# Patient Record
Sex: FEMALE | Race: WHITE | URBAN - METROPOLITAN AREA
[De-identification: names, ages, dates, MRNs, and addresses within clinical notes are randomized per-mention and may not be internally consistent; named-entity substitution may affect disease eponyms.]

---

## 2017-02-24 RX ORDER — RIVAROXABAN 20 MG/1
TABLET, FILM COATED ORAL
Qty: 90 TABLET | Refills: 2 | Status: SHIPPED | OUTPATIENT
Start: 2017-02-24 | End: 2017-11-24 | Stop reason: SDUPTHER

## 2017-02-24 RX ORDER — DIGOXIN 250 MCG
TABLET ORAL
Qty: 90 TABLET | Refills: 2 | Status: SHIPPED | OUTPATIENT
Start: 2017-02-24 | End: 2017-03-09 | Stop reason: ALTCHOICE

## 2017-03-09 ENCOUNTER — OFFICE VISIT (OUTPATIENT)
Dept: CARDIOLOGY CLINIC | Age: 82
End: 2017-03-09

## 2017-03-09 VITALS
SYSTOLIC BLOOD PRESSURE: 120 MMHG | BODY MASS INDEX: 25.39 KG/M2 | WEIGHT: 130 LBS | HEART RATE: 60 BPM | DIASTOLIC BLOOD PRESSURE: 80 MMHG

## 2017-03-09 DIAGNOSIS — R06.02 SOB (SHORTNESS OF BREATH): ICD-10-CM

## 2017-03-09 DIAGNOSIS — I48.21 PERMANENT ATRIAL FIBRILLATION (HCC): Primary | ICD-10-CM

## 2017-03-09 PROCEDURE — 4040F PNEUMOC VAC/ADMIN/RCVD: CPT | Performed by: NURSE PRACTITIONER

## 2017-03-09 PROCEDURE — 1123F ACP DISCUSS/DSCN MKR DOCD: CPT | Performed by: NURSE PRACTITIONER

## 2017-03-09 PROCEDURE — 1036F TOBACCO NON-USER: CPT | Performed by: NURSE PRACTITIONER

## 2017-03-09 PROCEDURE — G8484 FLU IMMUNIZE NO ADMIN: HCPCS | Performed by: NURSE PRACTITIONER

## 2017-03-09 PROCEDURE — 99214 OFFICE O/P EST MOD 30 MIN: CPT | Performed by: NURSE PRACTITIONER

## 2017-03-09 PROCEDURE — 1090F PRES/ABSN URINE INCON ASSESS: CPT | Performed by: NURSE PRACTITIONER

## 2017-03-09 PROCEDURE — G8427 DOCREV CUR MEDS BY ELIG CLIN: HCPCS | Performed by: NURSE PRACTITIONER

## 2017-03-09 PROCEDURE — G8420 CALC BMI NORM PARAMETERS: HCPCS | Performed by: NURSE PRACTITIONER

## 2017-03-17 RX ORDER — AMLODIPINE BESYLATE 5 MG/1
TABLET ORAL
Qty: 30 TABLET | Refills: 5 | Status: SHIPPED | OUTPATIENT
Start: 2017-03-17 | End: 2017-08-16 | Stop reason: SDUPTHER

## 2017-03-22 RX ORDER — METOPROLOL TARTRATE 50 MG/1
TABLET, FILM COATED ORAL
Qty: 135 TABLET | Refills: 5 | Status: SHIPPED | OUTPATIENT
Start: 2017-03-22 | End: 2017-04-03 | Stop reason: ALTCHOICE

## 2017-04-03 ENCOUNTER — OFFICE VISIT (OUTPATIENT)
Dept: CARDIOLOGY CLINIC | Age: 82
End: 2017-04-03

## 2017-04-03 VITALS
WEIGHT: 127.4 LBS | BODY MASS INDEX: 24.88 KG/M2 | DIASTOLIC BLOOD PRESSURE: 76 MMHG | SYSTOLIC BLOOD PRESSURE: 110 MMHG | HEART RATE: 76 BPM

## 2017-04-03 DIAGNOSIS — I48.91 ATRIAL FIBRILLATION, UNSPECIFIED TYPE (HCC): ICD-10-CM

## 2017-04-03 DIAGNOSIS — I35.0 AORTIC VALVE STENOSIS, UNSPECIFIED ETIOLOGY: ICD-10-CM

## 2017-04-03 DIAGNOSIS — R06.02 SOB (SHORTNESS OF BREATH): Primary | ICD-10-CM

## 2017-04-03 DIAGNOSIS — E78.5 HYPERLIPIDEMIA, UNSPECIFIED HYPERLIPIDEMIA TYPE: ICD-10-CM

## 2017-04-03 PROCEDURE — 4040F PNEUMOC VAC/ADMIN/RCVD: CPT | Performed by: NURSE PRACTITIONER

## 2017-04-03 PROCEDURE — 1090F PRES/ABSN URINE INCON ASSESS: CPT | Performed by: NURSE PRACTITIONER

## 2017-04-03 PROCEDURE — 1123F ACP DISCUSS/DSCN MKR DOCD: CPT | Performed by: NURSE PRACTITIONER

## 2017-04-03 PROCEDURE — G8427 DOCREV CUR MEDS BY ELIG CLIN: HCPCS | Performed by: NURSE PRACTITIONER

## 2017-04-03 PROCEDURE — G8420 CALC BMI NORM PARAMETERS: HCPCS | Performed by: NURSE PRACTITIONER

## 2017-04-03 PROCEDURE — 99214 OFFICE O/P EST MOD 30 MIN: CPT | Performed by: NURSE PRACTITIONER

## 2017-04-03 PROCEDURE — 1036F TOBACCO NON-USER: CPT | Performed by: NURSE PRACTITIONER

## 2017-07-08 DIAGNOSIS — R06.02 SOB (SHORTNESS OF BREATH): ICD-10-CM

## 2017-07-08 DIAGNOSIS — I10 ESSENTIAL HYPERTENSION: ICD-10-CM

## 2017-07-08 DIAGNOSIS — R11.0 NAUSEA: ICD-10-CM

## 2017-07-08 DIAGNOSIS — T46.0X5D: ICD-10-CM

## 2017-07-10 RX ORDER — FUROSEMIDE 20 MG/1
TABLET ORAL
Qty: 60 TABLET | Refills: 5 | Status: SHIPPED | OUTPATIENT
Start: 2017-07-10 | End: 2017-09-27 | Stop reason: ALTCHOICE

## 2017-07-17 ENCOUNTER — OFFICE VISIT (OUTPATIENT)
Dept: CARDIOLOGY CLINIC | Age: 82
End: 2017-07-17

## 2017-07-17 ENCOUNTER — TELEPHONE (OUTPATIENT)
Dept: CARDIOLOGY CLINIC | Age: 82
End: 2017-07-17

## 2017-07-17 VITALS
DIASTOLIC BLOOD PRESSURE: 70 MMHG | BODY MASS INDEX: 25.39 KG/M2 | SYSTOLIC BLOOD PRESSURE: 102 MMHG | HEART RATE: 60 BPM | WEIGHT: 130 LBS

## 2017-07-17 DIAGNOSIS — I48.20 CHRONIC ATRIAL FIBRILLATION (HCC): ICD-10-CM

## 2017-07-17 DIAGNOSIS — I35.0 NONRHEUMATIC AORTIC VALVE STENOSIS: ICD-10-CM

## 2017-07-17 DIAGNOSIS — R06.02 SOB (SHORTNESS OF BREATH): ICD-10-CM

## 2017-07-17 DIAGNOSIS — I35.0 AORTIC VALVE STENOSIS, UNSPECIFIED ETIOLOGY: Primary | ICD-10-CM

## 2017-07-17 PROCEDURE — 4040F PNEUMOC VAC/ADMIN/RCVD: CPT | Performed by: NURSE PRACTITIONER

## 2017-07-17 PROCEDURE — G8427 DOCREV CUR MEDS BY ELIG CLIN: HCPCS | Performed by: NURSE PRACTITIONER

## 2017-07-17 PROCEDURE — 1123F ACP DISCUSS/DSCN MKR DOCD: CPT | Performed by: NURSE PRACTITIONER

## 2017-07-17 PROCEDURE — 1090F PRES/ABSN URINE INCON ASSESS: CPT | Performed by: NURSE PRACTITIONER

## 2017-07-17 PROCEDURE — 1036F TOBACCO NON-USER: CPT | Performed by: NURSE PRACTITIONER

## 2017-07-17 PROCEDURE — G8419 CALC BMI OUT NRM PARAM NOF/U: HCPCS | Performed by: NURSE PRACTITIONER

## 2017-07-17 PROCEDURE — 99214 OFFICE O/P EST MOD 30 MIN: CPT | Performed by: NURSE PRACTITIONER

## 2017-07-18 ENCOUNTER — HOSPITAL ENCOUNTER (OUTPATIENT)
Dept: NON INVASIVE DIAGNOSTICS | Age: 82
Discharge: HOME | End: 2017-07-18
Attending: NURSE PRACTITIONER | Admitting: NURSE PRACTITIONER

## 2017-07-18 DIAGNOSIS — I48.21 PERMANENT ATRIAL FIBRILLATION (HCC): ICD-10-CM

## 2017-07-18 DIAGNOSIS — R06.02 SHORTNESS OF BREATH: ICD-10-CM

## 2017-07-18 DIAGNOSIS — R06.02 SOB (SHORTNESS OF BREATH): ICD-10-CM

## 2017-07-18 DIAGNOSIS — I35.0 AORTIC VALVE STENOSIS, UNSPECIFIED ETIOLOGY: ICD-10-CM

## 2017-07-18 LAB
LV EF: 58 %
LVEF MODALITY: NORMAL

## 2017-07-24 ENCOUNTER — OFFICE VISIT (OUTPATIENT)
Dept: CARDIOLOGY CLINIC | Age: 82
End: 2017-07-24

## 2017-07-24 VITALS
BODY MASS INDEX: 25 KG/M2 | HEART RATE: 68 BPM | WEIGHT: 128 LBS | SYSTOLIC BLOOD PRESSURE: 100 MMHG | DIASTOLIC BLOOD PRESSURE: 60 MMHG

## 2017-07-24 DIAGNOSIS — R42 DIZZINESS: ICD-10-CM

## 2017-07-24 DIAGNOSIS — Z86.73 HISTORY OF CVA (CEREBROVASCULAR ACCIDENT): ICD-10-CM

## 2017-07-24 DIAGNOSIS — I35.0 NONRHEUMATIC AORTIC VALVE STENOSIS: Primary | ICD-10-CM

## 2017-07-24 DIAGNOSIS — R06.02 SOB (SHORTNESS OF BREATH): ICD-10-CM

## 2017-07-24 PROCEDURE — G8419 CALC BMI OUT NRM PARAM NOF/U: HCPCS | Performed by: INTERNAL MEDICINE

## 2017-07-24 PROCEDURE — 1090F PRES/ABSN URINE INCON ASSESS: CPT | Performed by: INTERNAL MEDICINE

## 2017-07-24 PROCEDURE — 1036F TOBACCO NON-USER: CPT | Performed by: INTERNAL MEDICINE

## 2017-07-24 PROCEDURE — 4040F PNEUMOC VAC/ADMIN/RCVD: CPT | Performed by: INTERNAL MEDICINE

## 2017-07-24 PROCEDURE — 1123F ACP DISCUSS/DSCN MKR DOCD: CPT | Performed by: INTERNAL MEDICINE

## 2017-07-24 PROCEDURE — G8427 DOCREV CUR MEDS BY ELIG CLIN: HCPCS | Performed by: INTERNAL MEDICINE

## 2017-07-24 PROCEDURE — 99214 OFFICE O/P EST MOD 30 MIN: CPT | Performed by: INTERNAL MEDICINE

## 2017-07-25 ENCOUNTER — TELEPHONE (OUTPATIENT)
Dept: CARDIOTHORACIC SURGERY | Age: 82
End: 2017-07-25

## 2017-08-01 ENCOUNTER — HOSPITAL ENCOUNTER (OUTPATIENT)
Dept: CT IMAGING | Age: 82
Discharge: HOME | End: 2017-08-01
Admitting: INTERNAL MEDICINE

## 2017-08-01 DIAGNOSIS — Z86.73 HISTORY OF CVA (CEREBROVASCULAR ACCIDENT): ICD-10-CM

## 2017-08-01 DIAGNOSIS — I35.0 NONRHEUMATIC AORTIC VALVE STENOSIS: ICD-10-CM

## 2017-08-01 DIAGNOSIS — R42 DIZZINESS: Primary | ICD-10-CM

## 2017-08-01 RX ORDER — ALBUTEROL SULFATE 2.5 MG/3ML
2.5 SOLUTION RESPIRATORY (INHALATION) ONCE
Status: DISCONTINUED | OUTPATIENT
Start: 2017-08-01 | End: 2017-08-02 | Stop reason: HOSPADM

## 2017-08-03 ENCOUNTER — TELEPHONE (OUTPATIENT)
Dept: CARDIOTHORACIC SURGERY | Age: 82
End: 2017-08-03

## 2017-08-07 ENCOUNTER — OFFICE VISIT (OUTPATIENT)
Dept: CARDIOTHORACIC SURGERY | Age: 82
End: 2017-08-07

## 2017-08-07 VITALS
RESPIRATION RATE: 18 BRPM | DIASTOLIC BLOOD PRESSURE: 84 MMHG | OXYGEN SATURATION: 95 % | SYSTOLIC BLOOD PRESSURE: 134 MMHG | WEIGHT: 128.6 LBS | BODY MASS INDEX: 25.92 KG/M2 | HEIGHT: 59 IN | HEART RATE: 64 BPM | TEMPERATURE: 97.7 F

## 2017-08-07 DIAGNOSIS — I35.0 SEVERE AORTIC STENOSIS: ICD-10-CM

## 2017-08-07 DIAGNOSIS — G45.9 TRANSIENT CEREBRAL ISCHEMIA, UNSPECIFIED TYPE: ICD-10-CM

## 2017-08-07 DIAGNOSIS — I10 ESSENTIAL HYPERTENSION: ICD-10-CM

## 2017-08-07 DIAGNOSIS — I48.20 CHRONIC ATRIAL FIBRILLATION (HCC): ICD-10-CM

## 2017-08-07 DIAGNOSIS — H35.30 MACULAR DEGENERATION OF BOTH EYES: ICD-10-CM

## 2017-08-07 PROCEDURE — 99203 OFFICE O/P NEW LOW 30 MIN: CPT | Performed by: THORACIC SURGERY (CARDIOTHORACIC VASCULAR SURGERY)

## 2017-08-07 RX ORDER — LANOLIN ALCOHOL/MO/W.PET/CERES
500 CREAM (GRAM) TOPICAL NIGHTLY
COMMUNITY
End: 2017-09-19

## 2017-08-07 RX ORDER — MULTIVIT WITH MINERALS/LUTEIN
1000 TABLET ORAL DAILY
COMMUNITY
End: 2017-09-19

## 2017-08-18 RX ORDER — AMLODIPINE BESYLATE 5 MG/1
TABLET ORAL
Qty: 90 TABLET | Refills: 1 | Status: SHIPPED | OUTPATIENT
Start: 2017-08-18 | End: 2017-09-27

## 2017-08-21 ENCOUNTER — TELEPHONE (OUTPATIENT)
Dept: CARDIOLOGY CLINIC | Age: 82
End: 2017-08-21

## 2017-08-23 ENCOUNTER — OFFICE VISIT (OUTPATIENT)
Dept: CARDIOLOGY CLINIC | Age: 82
End: 2017-08-23

## 2017-08-23 ENCOUNTER — OFFICE VISIT (OUTPATIENT)
Dept: CARDIOTHORACIC SURGERY | Age: 82
End: 2017-08-23

## 2017-08-23 VITALS
HEIGHT: 59 IN | SYSTOLIC BLOOD PRESSURE: 116 MMHG | DIASTOLIC BLOOD PRESSURE: 68 MMHG | OXYGEN SATURATION: 95 % | HEART RATE: 107 BPM | BODY MASS INDEX: 26.13 KG/M2 | WEIGHT: 129.6 LBS | TEMPERATURE: 97.6 F

## 2017-08-23 VITALS
HEART RATE: 72 BPM | DIASTOLIC BLOOD PRESSURE: 62 MMHG | SYSTOLIC BLOOD PRESSURE: 110 MMHG | BODY MASS INDEX: 26.14 KG/M2 | WEIGHT: 129.4 LBS

## 2017-08-23 DIAGNOSIS — R06.02 SOB (SHORTNESS OF BREATH): ICD-10-CM

## 2017-08-23 DIAGNOSIS — I35.0 NONRHEUMATIC AORTIC VALVE STENOSIS: Primary | ICD-10-CM

## 2017-08-23 DIAGNOSIS — I10 ESSENTIAL HYPERTENSION: ICD-10-CM

## 2017-08-23 DIAGNOSIS — I35.0 SEVERE AORTIC STENOSIS: Primary | ICD-10-CM

## 2017-08-23 DIAGNOSIS — I48.20 CHRONIC ATRIAL FIBRILLATION (HCC): ICD-10-CM

## 2017-08-23 PROCEDURE — 99213 OFFICE O/P EST LOW 20 MIN: CPT | Performed by: THORACIC SURGERY (CARDIOTHORACIC VASCULAR SURGERY)

## 2017-08-23 PROCEDURE — G8419 CALC BMI OUT NRM PARAM NOF/U: HCPCS | Performed by: INTERNAL MEDICINE

## 2017-08-23 PROCEDURE — G8427 DOCREV CUR MEDS BY ELIG CLIN: HCPCS | Performed by: INTERNAL MEDICINE

## 2017-08-23 PROCEDURE — 1090F PRES/ABSN URINE INCON ASSESS: CPT | Performed by: INTERNAL MEDICINE

## 2017-08-23 PROCEDURE — 1036F TOBACCO NON-USER: CPT | Performed by: INTERNAL MEDICINE

## 2017-08-23 PROCEDURE — 99214 OFFICE O/P EST MOD 30 MIN: CPT | Performed by: INTERNAL MEDICINE

## 2017-08-23 PROCEDURE — 1123F ACP DISCUSS/DSCN MKR DOCD: CPT | Performed by: INTERNAL MEDICINE

## 2017-08-23 PROCEDURE — 4040F PNEUMOC VAC/ADMIN/RCVD: CPT | Performed by: INTERNAL MEDICINE

## 2017-08-23 ASSESSMENT — ENCOUNTER SYMPTOMS
ALLERGIC/IMMUNOLOGIC NEGATIVE: 1
ABDOMINAL PAIN: 0
DIARRHEA: 1
COUGH: 1
CHEST TIGHTNESS: 1
ABDOMINAL DISTENTION: 0
EYE PAIN: 0
WHEEZING: 0
EYE ITCHING: 0
VOMITING: 0
NAUSEA: 0
BACK PAIN: 0
BLOOD IN STOOL: 0
EYE REDNESS: 0
SHORTNESS OF BREATH: 1
APNEA: 0
TROUBLE SWALLOWING: 0

## 2017-09-07 ENCOUNTER — HOSPITAL ENCOUNTER (OUTPATIENT)
Dept: OTHER | Age: 82
Discharge: HOME | End: 2017-09-07
Attending: INTERNAL MEDICINE | Admitting: INTERNAL MEDICINE

## 2017-09-07 DIAGNOSIS — Z01.811 PRE-OP CHEST EXAM: ICD-10-CM

## 2017-09-07 LAB
ABO/RH: NORMAL
ALBUMIN SERPL-MCNC: 4.1 G/DL (ref 3.4–5)
ALP BLD-CCNC: 76 U/L (ref 40–129)
ALT SERPL-CCNC: 10 U/L (ref 10–40)
ANION GAP SERPL CALCULATED.3IONS-SCNC: 10 MMOL/L (ref 3–16)
ANTIBODY SCREEN: NORMAL
APTT: 48.4 SEC (ref 24.1–34.9)
AST SERPL-CCNC: 18 U/L (ref 15–37)
BACTERIA: ABNORMAL /HPF
BASOPHILS ABSOLUTE: 0.1 K/UL (ref 0–0.2)
BASOPHILS RELATIVE PERCENT: 1.2 %
BILIRUB SERPL-MCNC: 0.8 MG/DL (ref 0–1)
BILIRUBIN DIRECT: <0.2 MG/DL (ref 0–0.3)
BILIRUBIN URINE: NEGATIVE
BILIRUBIN, INDIRECT: NORMAL MG/DL (ref 0–1)
BLOOD, URINE: ABNORMAL
BUN BLDV-MCNC: 16 MG/DL (ref 7–20)
CALCIUM SERPL-MCNC: 9.8 MG/DL (ref 8.3–10.6)
CHLORIDE BLD-SCNC: 100 MMOL/L (ref 99–110)
CLARITY: ABNORMAL
CO2: 29 MMOL/L (ref 21–32)
COLOR: YELLOW
CREAT SERPL-MCNC: 0.6 MG/DL (ref 0.6–1.2)
EOSINOPHILS ABSOLUTE: 0.2 K/UL (ref 0–0.6)
EOSINOPHILS RELATIVE PERCENT: 3 %
EPITHELIAL CELLS, UA: 2 /HPF (ref 0–5)
GFR AFRICAN AMERICAN: >60
GFR NON-AFRICAN AMERICAN: >60
GLUCOSE BLD-MCNC: 93 MG/DL (ref 70–99)
GLUCOSE URINE: NEGATIVE MG/DL
HCT VFR BLD CALC: 36.9 % (ref 36–48)
HEMOGLOBIN: 12.2 G/DL (ref 12–16)
HYALINE CASTS: 4 /LPF (ref 0–8)
INR BLD: 2.96 (ref 0.85–1.15)
KETONES, URINE: NEGATIVE MG/DL
LEUKOCYTE ESTERASE, URINE: NEGATIVE
LYMPHOCYTES ABSOLUTE: 0.8 K/UL (ref 1–5.1)
LYMPHOCYTES RELATIVE PERCENT: 11.6 %
MAGNESIUM: 1.9 MG/DL (ref 1.8–2.4)
MCH RBC QN AUTO: 32.6 PG (ref 26–34)
MCHC RBC AUTO-ENTMCNC: 33 G/DL (ref 31–36)
MCV RBC AUTO: 98.9 FL (ref 80–100)
MICROSCOPIC EXAMINATION: YES
MONOCYTES ABSOLUTE: 0.9 K/UL (ref 0–1.3)
MONOCYTES RELATIVE PERCENT: 12.2 %
NEUTROPHILS ABSOLUTE: 5.1 K/UL (ref 1.7–7.7)
NEUTROPHILS RELATIVE PERCENT: 72 %
NITRITE, URINE: POSITIVE
PDW BLD-RTO: 14.2 % (ref 12.4–15.4)
PH UA: 7
PLATELET # BLD: 197 K/UL (ref 135–450)
PMV BLD AUTO: 8.4 FL (ref 5–10.5)
POTASSIUM SERPL-SCNC: 3.8 MMOL/L (ref 3.5–5.1)
PRO-BNP: 3108 PG/ML (ref 0–449)
PROTEIN UA: NEGATIVE MG/DL
PROTHROMBIN TIME: 33.5 SEC (ref 9.6–13)
RBC # BLD: 3.74 M/UL (ref 4–5.2)
RBC UA: 7 /HPF (ref 0–4)
SODIUM BLD-SCNC: 139 MMOL/L (ref 136–145)
SPECIFIC GRAVITY UA: 1.02
TOTAL PROTEIN: 7.6 G/DL (ref 6.4–8.2)
TROPONIN: <0.01 NG/ML
URINE REFLEX TO CULTURE: YES
URINE TYPE: 3
UROBILINOGEN, URINE: 0.2 E.U./DL
WBC # BLD: 7.1 K/UL (ref 4–11)
WBC UA: 6 /HPF (ref 0–5)

## 2017-09-09 ENCOUNTER — TELEPHONE (OUTPATIENT)
Dept: CARDIOLOGY CLINIC | Age: 82
End: 2017-09-09

## 2017-09-10 LAB
ORGANISM: ABNORMAL
URINE CULTURE, ROUTINE: ABNORMAL
URINE CULTURE, ROUTINE: ABNORMAL

## 2017-09-14 PROBLEM — I44.2 CHB (COMPLETE HEART BLOCK) (HCC): Status: ACTIVE | Noted: 2017-09-14

## 2017-09-16 ENCOUNTER — TELEPHONE (OUTPATIENT)
Dept: CARDIOLOGY CLINIC | Age: 82
End: 2017-09-16

## 2017-09-16 ENCOUNTER — CARE COORDINATION (OUTPATIENT)
Dept: CASE MANAGEMENT | Age: 82
End: 2017-09-16

## 2017-09-18 ENCOUNTER — TELEPHONE (OUTPATIENT)
Dept: PHARMACY | Facility: CLINIC | Age: 82
End: 2017-09-18

## 2017-09-18 NOTE — TELEPHONE ENCOUNTER
CLINICAL PHARMACY CONSULTATION: Transition of Care (LETICIA)  -----------------------------------------------------------------------------------------------  Miki Shown is a 80 y.o. female  who was discharged from [place] on 9/15/17 with a diagnosis of TAVR. Attempt made to contact pt. Unable to leave message for patient. Will continue to attempt to contact pt as appropriate.     Parisa Chanel, PharmD, [place] DANELLE [facility]ntein Pharmacist  Cell: [phone]  Toll free: [phone], option 7

## 2017-09-19 RX ORDER — ANTIARTHRITIC COMBINATION NO.2 900 MG
1 TABLET ORAL DAILY
COMMUNITY
End: 2021-01-22

## 2017-09-19 RX ORDER — CHLORAL HYDRATE 500 MG
1000 CAPSULE ORAL DAILY
COMMUNITY
End: 2018-04-19 | Stop reason: ALTCHOICE

## 2017-09-20 LAB
EKG ATRIAL RATE: 59 BPM
EKG DIAGNOSIS: NORMAL
EKG Q-T INTERVAL: 360 MS
EKG QRS DURATION: 74 MS
EKG QTC CALCULATION (BAZETT): 425 MS
EKG R AXIS: 10 DEGREES
EKG T AXIS: 23 DEGREES
EKG VENTRICULAR RATE: 84 BPM

## 2017-09-25 ENCOUNTER — TELEPHONE (OUTPATIENT)
Dept: CARDIOLOGY CLINIC | Age: 82
End: 2017-09-25

## 2017-09-27 ENCOUNTER — OFFICE VISIT (OUTPATIENT)
Dept: CARDIOLOGY CLINIC | Age: 82
End: 2017-09-27

## 2017-09-27 ENCOUNTER — PROCEDURE VISIT (OUTPATIENT)
Dept: CARDIOLOGY CLINIC | Age: 82
End: 2017-09-27

## 2017-09-27 VITALS
SYSTOLIC BLOOD PRESSURE: 110 MMHG | HEART RATE: 84 BPM | WEIGHT: 123.6 LBS | BODY MASS INDEX: 24.96 KG/M2 | DIASTOLIC BLOOD PRESSURE: 62 MMHG

## 2017-09-27 DIAGNOSIS — I48.20 CHRONIC ATRIAL FIBRILLATION (HCC): ICD-10-CM

## 2017-09-27 DIAGNOSIS — I10 ESSENTIAL HYPERTENSION: ICD-10-CM

## 2017-09-27 DIAGNOSIS — I48.20 CHRONIC ATRIAL FIBRILLATION (HCC): Primary | ICD-10-CM

## 2017-09-27 DIAGNOSIS — I35.0 NONRHEUMATIC AORTIC VALVE STENOSIS: Primary | ICD-10-CM

## 2017-09-27 DIAGNOSIS — I44.2 CHB (COMPLETE HEART BLOCK) (HCC): ICD-10-CM

## 2017-09-27 DIAGNOSIS — Z95.0 CARDIAC PACEMAKER IN SITU: ICD-10-CM

## 2017-09-27 DIAGNOSIS — R06.02 SOB (SHORTNESS OF BREATH): ICD-10-CM

## 2017-09-27 PROCEDURE — 93279 PRGRMG DEV EVAL PM/LDLS PM: CPT | Performed by: INTERNAL MEDICINE

## 2017-09-27 PROCEDURE — 99214 OFFICE O/P EST MOD 30 MIN: CPT | Performed by: INTERNAL MEDICINE

## 2017-09-27 PROCEDURE — 1036F TOBACCO NON-USER: CPT | Performed by: INTERNAL MEDICINE

## 2017-09-27 PROCEDURE — 4040F PNEUMOC VAC/ADMIN/RCVD: CPT | Performed by: INTERNAL MEDICINE

## 2017-09-27 PROCEDURE — 1090F PRES/ABSN URINE INCON ASSESS: CPT | Performed by: INTERNAL MEDICINE

## 2017-09-27 PROCEDURE — G8420 CALC BMI NORM PARAMETERS: HCPCS | Performed by: INTERNAL MEDICINE

## 2017-09-27 PROCEDURE — G8427 DOCREV CUR MEDS BY ELIG CLIN: HCPCS | Performed by: INTERNAL MEDICINE

## 2017-09-27 PROCEDURE — 1111F DSCHRG MED/CURRENT MED MERGE: CPT | Performed by: INTERNAL MEDICINE

## 2017-09-27 PROCEDURE — 1123F ACP DISCUSS/DSCN MKR DOCD: CPT | Performed by: INTERNAL MEDICINE

## 2017-09-28 NOTE — TELEPHONE ENCOUNTER
I think it is ok for her. It was a chronic med for her, so we restarted it.     Thanks,  Wayne Monday

## 2017-10-02 ENCOUNTER — TELEPHONE (OUTPATIENT)
Dept: CARDIOLOGY CLINIC | Age: 82
End: 2017-10-02

## 2017-10-02 PROBLEM — Z95.0 CARDIAC PACEMAKER IN SITU: Status: ACTIVE | Noted: 2017-10-02

## 2017-10-02 NOTE — TELEPHONE ENCOUNTER
Tay! Can you move up Mrs. Ohara's appointment with Dr. Mirza Stevens so that it is before 10/27/17 either at [facility] or  office? Thank you!   Leda Márquez RN  [phone]

## 2017-10-02 NOTE — TELEPHONE ENCOUNTER
Response noted. Thank you.     Dalia Senior, PharmD, formerly [facility], R [facility] Pharmacist  Direct: [phone]  Department, toll free: [phone], option 7    CLINICAL PHARMACY NOTE   POST-DISCHARGE TELEPHONE FOLLOW-UP ADDENDUM    For Pharmacy Admin Tracking Only    TCM Call Made?: Yes  [facility]) Select Patient?: Yes  Total # of Interventions Recommended: 1   - New Order #: 1  Total # Interventions Accepted: 0  Intervention Severity:   - Level 1 Intervention Present?: No   - Level 2 #: 0   - Level 3 #: 1  Outreach Status: Review Complete  Care Coordinator Outreach to Patient?: Yes  Provider Contacted?: Yes - Note Routed, Routine  Time Spent (min): 30

## 2017-10-04 ENCOUNTER — TELEPHONE (OUTPATIENT)
Dept: CARDIOLOGY CLINIC | Age: 82
End: 2017-10-04

## 2017-10-04 NOTE — TELEPHONE ENCOUNTER
LVM with pt stating that her appt with JAR needs to be rescheduled for 10/18 at 2:00. Her echo also needs to be rescheduled for prior to that appt. When she calls back, please help her schedule the echo for next week (Oct. 9-13) and confirm the new appt time.

## 2017-10-12 ENCOUNTER — HOSPITAL ENCOUNTER (OUTPATIENT)
Dept: NON INVASIVE DIAGNOSTICS | Age: 82
Discharge: HOME | End: 2017-10-12
Attending: INTERNAL MEDICINE | Admitting: INTERNAL MEDICINE

## 2017-10-12 DIAGNOSIS — I35.0 NONRHEUMATIC AORTIC VALVE STENOSIS: ICD-10-CM

## 2017-10-12 LAB
LV EF: 55 %
LVEF MODALITY: NORMAL

## 2017-10-18 ENCOUNTER — OFFICE VISIT (OUTPATIENT)
Dept: CARDIOLOGY CLINIC | Age: 82
End: 2017-10-18

## 2017-10-18 VITALS
SYSTOLIC BLOOD PRESSURE: 122 MMHG | HEART RATE: 80 BPM | WEIGHT: 127.4 LBS | BODY MASS INDEX: 25.73 KG/M2 | DIASTOLIC BLOOD PRESSURE: 60 MMHG

## 2017-10-18 DIAGNOSIS — I48.20 CHRONIC ATRIAL FIBRILLATION (HCC): Primary | ICD-10-CM

## 2017-10-18 DIAGNOSIS — I35.0 NONRHEUMATIC AORTIC VALVE STENOSIS: ICD-10-CM

## 2017-10-18 DIAGNOSIS — I10 ESSENTIAL HYPERTENSION: ICD-10-CM

## 2017-10-18 DIAGNOSIS — Z95.2 S/P TAVR (TRANSCATHETER AORTIC VALVE REPLACEMENT): ICD-10-CM

## 2017-10-18 PROCEDURE — 99024 POSTOP FOLLOW-UP VISIT: CPT | Performed by: INTERNAL MEDICINE

## 2017-10-18 NOTE — PROGRESS NOTES
weakness. Psychological: No anxiety or depression  Hematological and Lymphatic: No abnormal bleeding or bruising, blood clots, jaundice. Endocrine: No malaise/lethargy, palpitations, polydipsia/polyuria, temperature intolerance or unexpected weight changes. Skin: No rashes or non-healing ulcers. PE:  Blood pressure 122/60, pulse 80, weight 127 lb 6.4 oz (57.8 kg), last menstrual period 01/01/1970, not currently breastfeeding. General (appearance):  A/O. No distress. Eyes: Anicteric  Ears/Nose/Mouth/Thorat: No cyanosis  CV: Reg rhythm. Reg rate. S1/S2. 1-2/6 LEANDRO. Respiratory:  Clear B  GI: Abd soft, NT, nd  Skin: Warm, dry. No rashes  Neuro/Psych: Alert and oriented x 3. Appropriate behavior  Ext:  No c/c/e  Pulses:  2+ radial. Normal fem. Lab Results   Component Value Date    WBC 8.8 09/15/2017    HGB 10.4 (L) 09/15/2017    HCT 31.2 (L) 09/15/2017    MCV 98.2 09/15/2017     (L) 09/15/2017       Chemistry        Component Value Date/Time     (L) 09/15/2017 0518    K 3.6 09/15/2017 0518    CL 92 (L) 09/15/2017 0518    CO2 31 09/15/2017 0518    BUN 12 09/15/2017 0518    CREATININE 0.5 (L) 09/15/2017 0518        Component Value Date/Time    CALCIUM 9.0 09/15/2017 0518    ALKPHOS 76 09/07/2017 1005    AST 18 09/07/2017 1005    ALT 10 09/07/2017 1005    BILITOT 0.8 09/07/2017 1005        10/12/17 TTE:  Normal left ventricular size and wall thickness.   Normal left ventricular systolic function with an estimated ejection   fraction of 55%.   There are no definite regional wall motion abnormalities seen.   Abnormal septal motion.   Bi-atrial enlargement.   There is decreased leaflet motion and \"hockey stick\" appearance of the   mitral leaflets c/w mitral stenosis.   Moderate mitral regurgitation.   TAVR valve well seated with a mean gradient of 6 mmHg.   Mild pulmonic regurgitation.   Moderate tricuspid regurgitation. RVSP 40 mmHg.       Current Outpatient Prescriptions:     metoprolol tartrate (LOPRESSOR) 25 MG tablet, Take 1 tablet by mouth 2 times daily, Disp: 120 tablet, Rfl: 3    Biotin 5000 MCG TABS, Take 1 tablet by mouth daily, Disp: , Rfl:     Omega-3 Fatty Acids (FISH OIL) 1000 MG CAPS, Take 1,000 mg by mouth daily, Disp: , Rfl:     aspirin 81 MG EC tablet, Take 1 tablet by mouth daily, Disp: 30 tablet, Rfl: 3    Multiple Vitamins-Minerals (ICAPS PO), Take 2 capsules by mouth 2 times daily, Disp: , Rfl:     XARELTO 20 MG TABS tablet, TAKE ONE TABLET BY MOUTH DAILY, Disp: 90 tablet, Rfl: 2    A/P:  1. Chronic atrial fibrillation ([address]    2. Nonrheumatic aortic valve stenosis    3. Essential hypertension    4. S/P TAVR (transcatheter aortic valve replacement)      Recs:  -Cont meds  -F/U Dr. Elsy Castro in 3 mo; me in 1 year with an echo  -Cardiac rehab  -She's doing great. Alek Rausch MD, [facility], [facility]

## 2017-11-27 RX ORDER — RIVAROXABAN 20 MG/1
TABLET, FILM COATED ORAL
Qty: 90 TABLET | Refills: 1 | Status: SHIPPED | OUTPATIENT
Start: 2017-11-27 | End: 2018-04-19 | Stop reason: ALTCHOICE

## 2018-02-21 ENCOUNTER — PROCEDURE VISIT (OUTPATIENT)
Dept: CARDIOLOGY CLINIC | Age: 83
End: 2018-02-21

## 2018-02-21 ENCOUNTER — OFFICE VISIT (OUTPATIENT)
Dept: CARDIOLOGY CLINIC | Age: 83
End: 2018-02-21

## 2018-02-21 VITALS
WEIGHT: 134 LBS | HEART RATE: 63 BPM | BODY MASS INDEX: 27.06 KG/M2 | DIASTOLIC BLOOD PRESSURE: 84 MMHG | SYSTOLIC BLOOD PRESSURE: 130 MMHG

## 2018-02-21 DIAGNOSIS — Z95.0 CARDIAC PACEMAKER IN SITU: ICD-10-CM

## 2018-02-21 DIAGNOSIS — I48.20 CHRONIC ATRIAL FIBRILLATION (HCC): Primary | ICD-10-CM

## 2018-02-21 DIAGNOSIS — I44.2 CHB (COMPLETE HEART BLOCK) (HCC): ICD-10-CM

## 2018-02-21 DIAGNOSIS — I48.20 CHRONIC ATRIAL FIBRILLATION (HCC): ICD-10-CM

## 2018-02-21 DIAGNOSIS — I35.0 AORTIC VALVE STENOSIS, UNSPECIFIED ETIOLOGY: ICD-10-CM

## 2018-02-21 PROCEDURE — 1036F TOBACCO NON-USER: CPT | Performed by: INTERNAL MEDICINE

## 2018-02-21 PROCEDURE — 1090F PRES/ABSN URINE INCON ASSESS: CPT | Performed by: INTERNAL MEDICINE

## 2018-02-21 PROCEDURE — G8427 DOCREV CUR MEDS BY ELIG CLIN: HCPCS | Performed by: INTERNAL MEDICINE

## 2018-02-21 PROCEDURE — 4040F PNEUMOC VAC/ADMIN/RCVD: CPT | Performed by: INTERNAL MEDICINE

## 2018-02-21 PROCEDURE — G8484 FLU IMMUNIZE NO ADMIN: HCPCS | Performed by: INTERNAL MEDICINE

## 2018-02-21 PROCEDURE — 1123F ACP DISCUSS/DSCN MKR DOCD: CPT | Performed by: INTERNAL MEDICINE

## 2018-02-21 PROCEDURE — 99214 OFFICE O/P EST MOD 30 MIN: CPT | Performed by: INTERNAL MEDICINE

## 2018-02-21 PROCEDURE — G8419 CALC BMI OUT NRM PARAM NOF/U: HCPCS | Performed by: INTERNAL MEDICINE

## 2018-02-21 NOTE — PROGRESS NOTES
aortic valve replacement with a TAVR procedure.   This note was likely completed using voice recognition technology and may contain unintended errors    Please copy this note to Dr. Gustavo Ch M.D.

## 2018-03-09 ENCOUNTER — TELEPHONE (OUTPATIENT)
Dept: CARDIOLOGY CLINIC | Age: 83
End: 2018-03-09

## 2018-03-14 ENCOUNTER — TELEPHONE (OUTPATIENT)
Dept: CARDIOLOGY CLINIC | Age: 83
End: 2018-03-14

## 2018-03-14 NOTE — TELEPHONE ENCOUNTER
Marisa Dillard,  Ms. Jurgen Parks returned your call this morning. She wasn't sure what it's about, but said you left her a message to call you. She is going to rehab now, but will be back home later today. Please call her again.

## 2018-03-16 ENCOUNTER — TELEPHONE (OUTPATIENT)
Dept: CARDIOLOGY CLINIC | Age: 83
End: 2018-03-16

## 2018-03-16 ENCOUNTER — OFFICE VISIT (OUTPATIENT)
Dept: CARDIOLOGY CLINIC | Age: 83
End: 2018-03-16

## 2018-03-16 VITALS
WEIGHT: 137.8 LBS | SYSTOLIC BLOOD PRESSURE: 130 MMHG | DIASTOLIC BLOOD PRESSURE: 80 MMHG | BODY MASS INDEX: 27.83 KG/M2 | HEART RATE: 80 BPM

## 2018-03-16 DIAGNOSIS — I35.0 SEVERE AORTIC STENOSIS: ICD-10-CM

## 2018-03-16 DIAGNOSIS — I48.20 CHRONIC ATRIAL FIBRILLATION (HCC): ICD-10-CM

## 2018-03-16 DIAGNOSIS — I44.2 CHB (COMPLETE HEART BLOCK) (HCC): ICD-10-CM

## 2018-03-16 DIAGNOSIS — I35.0 NONRHEUMATIC AORTIC VALVE STENOSIS: Primary | ICD-10-CM

## 2018-03-16 DIAGNOSIS — G45.9 TRANSIENT CEREBRAL ISCHEMIA, UNSPECIFIED TYPE: ICD-10-CM

## 2018-03-16 DIAGNOSIS — I50.33 ACUTE ON CHRONIC DIASTOLIC CHF (CONGESTIVE HEART FAILURE), NYHA CLASS 2 (HCC): ICD-10-CM

## 2018-03-16 DIAGNOSIS — I10 ESSENTIAL HYPERTENSION: ICD-10-CM

## 2018-03-16 PROCEDURE — 1036F TOBACCO NON-USER: CPT | Performed by: NURSE PRACTITIONER

## 2018-03-16 PROCEDURE — 99214 OFFICE O/P EST MOD 30 MIN: CPT | Performed by: NURSE PRACTITIONER

## 2018-03-16 PROCEDURE — 4040F PNEUMOC VAC/ADMIN/RCVD: CPT | Performed by: NURSE PRACTITIONER

## 2018-03-16 PROCEDURE — 1123F ACP DISCUSS/DSCN MKR DOCD: CPT | Performed by: NURSE PRACTITIONER

## 2018-03-16 PROCEDURE — G8419 CALC BMI OUT NRM PARAM NOF/U: HCPCS | Performed by: NURSE PRACTITIONER

## 2018-03-16 PROCEDURE — G8484 FLU IMMUNIZE NO ADMIN: HCPCS | Performed by: NURSE PRACTITIONER

## 2018-03-16 PROCEDURE — 1090F PRES/ABSN URINE INCON ASSESS: CPT | Performed by: NURSE PRACTITIONER

## 2018-03-16 PROCEDURE — G8427 DOCREV CUR MEDS BY ELIG CLIN: HCPCS | Performed by: NURSE PRACTITIONER

## 2018-03-16 RX ORDER — POTASSIUM CHLORIDE 20 MEQ/1
20 TABLET, EXTENDED RELEASE ORAL DAILY
Qty: 60 TABLET | Refills: 3 | Status: SHIPPED | OUTPATIENT
Start: 2018-03-16 | End: 2021-01-22 | Stop reason: SDUPTHER

## 2018-03-16 RX ORDER — FUROSEMIDE 40 MG/1
40 TABLET ORAL 2 TIMES DAILY
Qty: 30 TABLET | Refills: 5 | Status: SHIPPED | OUTPATIENT
Start: 2018-03-16 | End: 2018-03-16 | Stop reason: SDUPTHER

## 2018-03-16 RX ORDER — FUROSEMIDE 40 MG/1
40 TABLET ORAL DAILY
Qty: 30 TABLET | Refills: 5 | Status: SHIPPED | OUTPATIENT
Start: 2018-03-16 | End: 2018-10-24

## 2018-03-16 NOTE — PROGRESS NOTES
[facility]   Dr Luis Georges. [address] [place], [address]                                                 Outpatient Follow Up Note      CC: fu with c/o some SOB with SOB with activity/ has mild ower leg edema bilateral  Up 5lbs, more fatigued and little energy worsening   Sleeps in recliner for years     03/16/18  HPI:  Deedee Dee is 80 y.o. female who presents today with hx follow Up secondary to hypertension, hyperlipidemia andAortic valve stenosis treated by TAVR   Status post TAVR 9/12/17 for aortic valve stenosis  Pacemaker implant 9/17      Past Medical History:   Diagnosis Date    Atrial fibrillation ([address]     Cataract, bilateral     Essential hypertension     Hyperlipidemia     Macular degeneration of both eyes     Severe aortic stenosis     TIA (transient ischemic attack) 2014     [address]  Past Surgical History:   Procedure Laterality Date    AORTIC VALVE REPLACEMENT  09/12/2017    Dr. Devonda Schirmer - transcatheter w/MANJEET 3 valve    APPENDECTOMY  1944    CARDIOVERSION  06/08/2014    Dr. Cristin Bynum Bilateral 2012    DILATION AND CURETTAGE OF UTERUS      Multiple    HYSTERECTOMY  1970    PACEMAKER PLACEMENT Left 09/14/2017    Dr. Brandon Ramesh. Henthorn - single chamber    TONSILLECTOMY  1937     SH: History   Smoking Status    Never Smoker   Smokeless Tobacco    Never Used     Comment: Not needed     Current Medications:  Prior to Visit Medications    Medication Sig Taking?  Authorizing Provider   metoprolol tartrate (LOPRESSOR) 25 MG tablet Take 2 tablets by mouth 2 times daily Yes Kareem Rondon NP   XARELTO 20 MG TABS tablet TAKE ONE TABLET BY MOUTH DAILY Yes Dyanna Krabbe, MD   Biotin 5000 MCG TABS Take 1 tablet by mouth daily Yes Historical Provider, MD   Omega-3 Fatty Acids (FISH OIL) 1000 MG CAPS Take 1,000 mg by mouth daily Yes Historical Provider, MD   aspirin 81 MG EC tablet Take 1 tablet by mouth daily trivial aortic valve insufficiency. 9. Essentially normal coronary arteries. 10. Trivial minor elevation of the pulmonary artery systolic pressure  Patient education on touch screen in room   Discussed heart heathy lifestyle including nutrition, exercise & importance of nonsmoking,        AS  TAVR 9/12/17   Echo 10/2017   Normal left ventricular size and wall thickness.   Normal left ventricular systolic function with an estimated ejection   fraction of 55%.   There are no definite regional wall motion abnormalities seen.   Abnormal septal motion.   Bi-atrial enlargement.   There is decreased leaflet motion and \"hockey stick\" appearance of the   mitral leaflets c/w mitral stenosis.   Moderate mitral regurgitation.   TAVR valve well seated with a mean gradient of 6 mmHg.   Mild pulmonic regurgitation.   Moderate tricuspid regurgitation.  RVSP 40 mmHg.     Plan:  Start lasix 40mg daily with kdur 20 meq    Discussed sodium intake / actively   Blood work and fu in 2-3 weeks       [address] [facility] [place]

## 2018-03-27 ENCOUNTER — NURSE ONLY (OUTPATIENT)
Dept: CARDIOLOGY CLINIC | Age: 83
End: 2018-03-27

## 2018-03-27 DIAGNOSIS — I48.20 CHRONIC ATRIAL FIBRILLATION (HCC): ICD-10-CM

## 2018-03-27 DIAGNOSIS — Z95.0 CARDIAC PACEMAKER IN SITU: ICD-10-CM

## 2018-03-27 DIAGNOSIS — I44.2 CHB (COMPLETE HEART BLOCK) (HCC): ICD-10-CM

## 2018-03-27 PROCEDURE — 93296 REM INTERROG EVL PM/IDS: CPT | Performed by: INTERNAL MEDICINE

## 2018-03-27 PROCEDURE — 93294 REM INTERROG EVL PM/LDLS PM: CPT | Performed by: INTERNAL MEDICINE

## 2018-03-27 NOTE — LETTER
[address] [facility] [phone] [address] [facility],4Th [address] [phone]    Pacemaker/Defibrillator Clinic          03/27/18        Amina Morales  [address]  [address]        Dear Amina Morales    This letter is to inform you that we received the transmission from your monitor at home that checks your pacemaker and/or defibrillator, or implanted heart monitor. The next date you should transmit will be 7-10-18. Please do not send additional routine transmissions unless specifically requested. Please be aware that the remote device transmission sites are periodically monitored only during regular business hours during which simultaneous in-office device clinics are being run. If your transmission requires attention, we will contact you as soon as possible. Thank you.             [address]

## 2018-04-19 ENCOUNTER — OFFICE VISIT (OUTPATIENT)
Dept: CARDIOLOGY CLINIC | Age: 83
End: 2018-04-19

## 2018-04-19 VITALS
SYSTOLIC BLOOD PRESSURE: 132 MMHG | DIASTOLIC BLOOD PRESSURE: 84 MMHG | BODY MASS INDEX: 26.66 KG/M2 | WEIGHT: 132 LBS | HEART RATE: 52 BPM

## 2018-04-19 DIAGNOSIS — Z86.73 HISTORY OF CVA (CEREBROVASCULAR ACCIDENT): ICD-10-CM

## 2018-04-19 DIAGNOSIS — G45.9 TRANSIENT CEREBRAL ISCHEMIA, UNSPECIFIED TYPE: ICD-10-CM

## 2018-04-19 DIAGNOSIS — I44.2 CHB (COMPLETE HEART BLOCK) (HCC): ICD-10-CM

## 2018-04-19 DIAGNOSIS — I10 ESSENTIAL HYPERTENSION: ICD-10-CM

## 2018-04-19 DIAGNOSIS — I35.0 SEVERE AORTIC STENOSIS: ICD-10-CM

## 2018-04-19 DIAGNOSIS — Z95.0 CARDIAC PACEMAKER IN SITU: ICD-10-CM

## 2018-04-19 DIAGNOSIS — I48.20 CHRONIC ATRIAL FIBRILLATION (HCC): ICD-10-CM

## 2018-04-19 DIAGNOSIS — R06.02 SOB (SHORTNESS OF BREATH): Primary | ICD-10-CM

## 2018-04-19 DIAGNOSIS — I50.33 ACUTE ON CHRONIC DIASTOLIC CHF (CONGESTIVE HEART FAILURE), NYHA CLASS 2 (HCC): ICD-10-CM

## 2018-04-19 DIAGNOSIS — I48.0 PAROXYSMAL ATRIAL FIBRILLATION (HCC): ICD-10-CM

## 2018-04-19 PROCEDURE — G8419 CALC BMI OUT NRM PARAM NOF/U: HCPCS | Performed by: NURSE PRACTITIONER

## 2018-04-19 PROCEDURE — G8427 DOCREV CUR MEDS BY ELIG CLIN: HCPCS | Performed by: NURSE PRACTITIONER

## 2018-04-19 PROCEDURE — 4040F PNEUMOC VAC/ADMIN/RCVD: CPT | Performed by: NURSE PRACTITIONER

## 2018-04-19 PROCEDURE — 1036F TOBACCO NON-USER: CPT | Performed by: NURSE PRACTITIONER

## 2018-04-19 PROCEDURE — 99214 OFFICE O/P EST MOD 30 MIN: CPT | Performed by: NURSE PRACTITIONER

## 2018-04-19 PROCEDURE — 1090F PRES/ABSN URINE INCON ASSESS: CPT | Performed by: NURSE PRACTITIONER

## 2018-04-19 PROCEDURE — 1123F ACP DISCUSS/DSCN MKR DOCD: CPT | Performed by: NURSE PRACTITIONER

## 2018-05-10 ENCOUNTER — TELEPHONE (OUTPATIENT)
Dept: CARDIOLOGY CLINIC | Age: 83
End: 2018-05-10

## 2018-06-05 ENCOUNTER — TELEPHONE (OUTPATIENT)
Dept: CARDIOLOGY CLINIC | Age: 83
End: 2018-06-05

## 2018-06-07 ENCOUNTER — TELEPHONE (OUTPATIENT)
Dept: CARDIOLOGY CLINIC | Age: 83
End: 2018-06-07

## 2018-06-07 DIAGNOSIS — I35.0 NONRHEUMATIC AORTIC VALVE STENOSIS: ICD-10-CM

## 2018-06-07 DIAGNOSIS — I48.20 CHRONIC ATRIAL FIBRILLATION (HCC): Primary | ICD-10-CM

## 2018-06-07 DIAGNOSIS — I10 ESSENTIAL HYPERTENSION: ICD-10-CM

## 2018-06-07 DIAGNOSIS — R09.89 CAROTID BRUIT, UNSPECIFIED LATERALITY: ICD-10-CM

## 2018-06-07 DIAGNOSIS — I50.33 ACUTE ON CHRONIC DIASTOLIC CHF (CONGESTIVE HEART FAILURE), NYHA CLASS 2 (HCC): ICD-10-CM

## 2018-06-08 ENCOUNTER — TELEPHONE (OUTPATIENT)
Dept: CARDIOLOGY CLINIC | Age: 83
End: 2018-06-08

## 2018-06-08 DIAGNOSIS — I35.0 NONRHEUMATIC AORTIC VALVE STENOSIS: Primary | ICD-10-CM

## 2018-06-11 RX ORDER — FUROSEMIDE 40 MG/1
TABLET ORAL
Qty: 90 TABLET | Refills: 1 | Status: SHIPPED | OUTPATIENT
Start: 2018-06-11 | End: 2018-06-25 | Stop reason: SDUPTHER

## 2018-06-15 ENCOUNTER — HOSPITAL ENCOUNTER (OUTPATIENT)
Dept: NON INVASIVE DIAGNOSTICS | Age: 83
Discharge: HOME | End: 2018-06-15
Attending: NURSE PRACTITIONER | Admitting: NURSE PRACTITIONER

## 2018-06-15 DIAGNOSIS — I48.20 CHRONIC ATRIAL FIBRILLATION (HCC): ICD-10-CM

## 2018-06-15 DIAGNOSIS — G45.8 OTHER SPECIFIED TRANSIENT CEREBRAL ISCHEMIAS: Primary | ICD-10-CM

## 2018-06-15 LAB
LV EF: 58 %
LVEF MODALITY: NORMAL

## 2018-06-25 ENCOUNTER — OFFICE VISIT (OUTPATIENT)
Dept: CARDIOLOGY CLINIC | Age: 83
End: 2018-06-25

## 2018-06-25 VITALS
SYSTOLIC BLOOD PRESSURE: 124 MMHG | WEIGHT: 134 LBS | HEART RATE: 60 BPM | BODY MASS INDEX: 27.06 KG/M2 | DIASTOLIC BLOOD PRESSURE: 84 MMHG

## 2018-06-25 DIAGNOSIS — I35.0 NONRHEUMATIC AORTIC VALVE STENOSIS: ICD-10-CM

## 2018-06-25 DIAGNOSIS — I48.20 CHRONIC ATRIAL FIBRILLATION (HCC): Primary | ICD-10-CM

## 2018-06-25 DIAGNOSIS — I44.2 CHB (COMPLETE HEART BLOCK) (HCC): ICD-10-CM

## 2018-06-25 DIAGNOSIS — I50.33 ACUTE ON CHRONIC DIASTOLIC CHF (CONGESTIVE HEART FAILURE), NYHA CLASS 2 (HCC): ICD-10-CM

## 2018-06-25 DIAGNOSIS — G45.8 OTHER SPECIFIED TRANSIENT CEREBRAL ISCHEMIAS: ICD-10-CM

## 2018-06-25 DIAGNOSIS — I35.0 SEVERE AORTIC STENOSIS: ICD-10-CM

## 2018-06-25 DIAGNOSIS — I10 ESSENTIAL HYPERTENSION: ICD-10-CM

## 2018-06-25 PROCEDURE — 4040F PNEUMOC VAC/ADMIN/RCVD: CPT | Performed by: NURSE PRACTITIONER

## 2018-06-25 PROCEDURE — 1090F PRES/ABSN URINE INCON ASSESS: CPT | Performed by: NURSE PRACTITIONER

## 2018-06-25 PROCEDURE — 1123F ACP DISCUSS/DSCN MKR DOCD: CPT | Performed by: NURSE PRACTITIONER

## 2018-06-25 PROCEDURE — G8427 DOCREV CUR MEDS BY ELIG CLIN: HCPCS | Performed by: NURSE PRACTITIONER

## 2018-06-25 PROCEDURE — 1036F TOBACCO NON-USER: CPT | Performed by: NURSE PRACTITIONER

## 2018-06-25 PROCEDURE — G8419 CALC BMI OUT NRM PARAM NOF/U: HCPCS | Performed by: NURSE PRACTITIONER

## 2018-06-25 PROCEDURE — 99214 OFFICE O/P EST MOD 30 MIN: CPT | Performed by: NURSE PRACTITIONER

## 2018-06-25 RX ORDER — ATORVASTATIN CALCIUM 20 MG/1
20 TABLET, FILM COATED ORAL DAILY
Qty: 90 TABLET | Refills: 2 | Status: SHIPPED | OUTPATIENT
Start: 2018-06-25 | End: 2018-07-30 | Stop reason: SINTOL

## 2018-06-29 ENCOUNTER — HOSPITAL ENCOUNTER (OUTPATIENT)
Dept: NON INVASIVE DIAGNOSTICS | Age: 83
Discharge: HOME | End: 2018-06-29
Attending: NURSE PRACTITIONER | Admitting: NURSE PRACTITIONER

## 2018-06-29 DIAGNOSIS — I48.20 CHRONIC ATRIAL FIBRILLATION (HCC): ICD-10-CM

## 2018-07-10 ENCOUNTER — NURSE ONLY (OUTPATIENT)
Dept: CARDIOLOGY CLINIC | Age: 83
End: 2018-07-10

## 2018-07-10 DIAGNOSIS — Z95.0 CARDIAC PACEMAKER IN SITU: ICD-10-CM

## 2018-07-10 DIAGNOSIS — I44.2 CHB (COMPLETE HEART BLOCK) (HCC): ICD-10-CM

## 2018-07-10 DIAGNOSIS — I48.20 CHRONIC ATRIAL FIBRILLATION (HCC): ICD-10-CM

## 2018-07-10 PROCEDURE — 93296 REM INTERROG EVL PM/IDS: CPT | Performed by: INTERNAL MEDICINE

## 2018-07-10 PROCEDURE — 93294 REM INTERROG EVL PM/LDLS PM: CPT | Performed by: INTERNAL MEDICINE

## 2018-07-10 NOTE — LETTER
[address] [facility] [phone]  [address] [facility],4Th Floor [phone]    Pacemaker/Defibrillator Clinic          07/10/18        Lindsay Astudillo  [address] [facility] [address]        Dear Lindsay Astudillo    This letter is to inform you that we received the transmission from your monitor at home that checks your pacemaker and/or defibrillator, or implanted heart monitor. The next date you should transmit will be 10-16-18. Please do not send additional routine transmissions unless specifically requested. Please be aware that the remote device transmission sites are periodically monitored only during regular business hours during which simultaneous in-office device clinics are being run. If your transmission requires attention, we will contact you as soon as possible. Thank you.             Cat [address]

## 2018-07-30 ENCOUNTER — OFFICE VISIT (OUTPATIENT)
Dept: CARDIOLOGY CLINIC | Age: 83
End: 2018-07-30

## 2018-07-30 VITALS
HEIGHT: 59 IN | WEIGHT: 138.6 LBS | BODY MASS INDEX: 27.94 KG/M2 | SYSTOLIC BLOOD PRESSURE: 110 MMHG | DIASTOLIC BLOOD PRESSURE: 80 MMHG | HEART RATE: 97 BPM

## 2018-07-30 DIAGNOSIS — I48.20 CHRONIC ATRIAL FIBRILLATION (HCC): ICD-10-CM

## 2018-07-30 DIAGNOSIS — R06.02 SOB (SHORTNESS OF BREATH): Primary | ICD-10-CM

## 2018-07-30 DIAGNOSIS — I35.0 NONRHEUMATIC AORTIC VALVE STENOSIS: ICD-10-CM

## 2018-07-30 PROCEDURE — 1036F TOBACCO NON-USER: CPT | Performed by: NURSE PRACTITIONER

## 2018-07-30 PROCEDURE — G8427 DOCREV CUR MEDS BY ELIG CLIN: HCPCS | Performed by: NURSE PRACTITIONER

## 2018-07-30 PROCEDURE — 1090F PRES/ABSN URINE INCON ASSESS: CPT | Performed by: NURSE PRACTITIONER

## 2018-07-30 PROCEDURE — 99214 OFFICE O/P EST MOD 30 MIN: CPT | Performed by: NURSE PRACTITIONER

## 2018-07-30 PROCEDURE — G8419 CALC BMI OUT NRM PARAM NOF/U: HCPCS | Performed by: NURSE PRACTITIONER

## 2018-07-30 PROCEDURE — 1101F PT FALLS ASSESS-DOCD LE1/YR: CPT | Performed by: NURSE PRACTITIONER

## 2018-07-30 PROCEDURE — 4040F PNEUMOC VAC/ADMIN/RCVD: CPT | Performed by: NURSE PRACTITIONER

## 2018-07-30 PROCEDURE — 1123F ACP DISCUSS/DSCN MKR DOCD: CPT | Performed by: NURSE PRACTITIONER

## 2018-07-30 NOTE — PROGRESS NOTES
[address]   Dr Atilio Malave. [address] [place], [address]                                                 Outpatient Follow Up Note      CC: Fu with AS/ TAVR aifb HTN HDL   Alert pleasant  / goes to cadiac rehab / appears near euvolemic  No c/o cp SOB edema   c/o muscle cramping in all large muscles  Will do lab work & take statin holiday / fu in 3 months       07/30/18   HPI:  Rancho Azul is 80 y.o. female who presents today with  afib AS with TAVR      Pacemaker implant 9/17  /  normal cors per [facility] 4/2014        Past Medical History:   Diagnosis Date    Atrial fibrillation ([address]     Cataract, bilateral     Essential hypertension     Hyperlipidemia     Macular degeneration of both eyes     Severe aortic stenosis     TIA (transient ischemic attack) 2014     [address]  Past Surgical History:   Procedure Laterality Date    AORTIC VALVE REPLACEMENT  09/12/2017    Dr. Menard - transcatheter w/MANJEET 3 valve    APPENDECTOMY  1944    CARDIOVERSION  06/08/2014    Dr. Nabila Simons Bilateral 2012    DILATION AND CURETTAGE OF UTERUS      Multiple   [address] Left 09/14/2017    Dr. Nando Denton - single chamber    TONSILLECTOMY  1937     SH: History   Smoking Status    Never Smoker   Smokeless Tobacco    Never Used     Comment: Not needed     Current Medications:  Prior to Visit Medications    Medication Sig Taking?  Authorizing Provider   rivaroxaban (XARELTO) 15 MG TABS tablet Take 1 tablet by mouth daily (with breakfast) Yes OLVIN Mott CNP   metoprolol tartrate (LOPRESSOR) 25 MG tablet Take 1 tablet by mouth 2 times daily Yes OLVIN Mott CNP   potassium chloride (KLOR-CON M) 20 MEQ extended release tablet Take 1 tablet by mouth daily Yes OLVIN Mott CNP   furosemide (LASIX) 40 MG tablet Take 1 tablet by mouth daily Yes OLVIN Mott CNP   Biotin 2015   Not on statin      recently has been dizzy / no presyncope / syncope / consider to ENT to assess any ear problems or cerumen      Plan:   Patient education on touch screen in room   Discussed heart heathy lifestyle including nutrition, exercise & importance of nonsmoking,       c/o \"going blind\" for seconds x one  event  / she went to her eye dr. Katty Gunn he sent her to cardiology      ? amaurosis fugax us carotid : 6/2018   Duplex sonography with color flow enhancement was performed bilaterally on    the cervical carotid system. No evidence of hemodynamically significant     stenosis in the bilateral carotid and vertebral arteries.       Echo bubble bubble study was performed and fails to show any evidence of right to left   shunting.       Plan:   Patient education on touch screen in room   Discussed heart heathy lifestyle including nutrition, exercise & importance of nonsmoking    c/o muscle cramping in all large muscles  Will do lab work & take statin holiday / fu in 3 months       [address]

## 2018-08-02 NOTE — COMMUNICATION BODY
[address]   Dr Joseph Gay. [address], [address]                                                 Outpatient Follow Up Note      CC: Fu with AS/ TAVR aifb HTN HDL   Alert pleasant  / goes to cadiac rehab / appears near euvolemic  No c/o cp SOB edema   c/o muscle cramping in all large muscles  Will do lab work & take statin holiday / fu in 3 months       07/30/18   HPI:  Jeffrey Jackson is 80 y.o. female who presents today with  afib AS with TAVR      Pacemaker implant 9/17  /  normal cors per [facility] 4/2014        Past Medical History:   Diagnosis Date    Atrial fibrillation ([address]     Cataract, bilateral     Essential hypertension     Hyperlipidemia     Macular degeneration of both eyes     Severe aortic stenosis     TIA (transient ischemic attack) 2014     [address] [facility]  Past Surgical History:   Procedure Laterality Date    AORTIC VALVE REPLACEMENT  09/12/2017    Dr. Adan Pickard - transcatheter w/MANJEET 3 valve    APPENDECTOMY  1944    CARDIOVERSION  06/08/2014    Dr. Jackie Echavarria Bilateral 2012    DILATION AND CURETTAGE OF UTERUS      Multiple   [address] Left 09/14/2017    Dr. Veleta Schwab. Henorn - single chamber    TONSILLECTOMY  1937     SH: History   Smoking Status    Never Smoker   Smokeless Tobacco    Never Used     Comment: Not needed     Current Medications:  Prior to Visit Medications    Medication Sig Taking?  Authorizing Provider   rivaroxaban (XARELTO) 15 MG TABS tablet Take 1 tablet by mouth daily (with breakfast) Yes OLVIN Carlisle CNP   metoprolol tartrate (LOPRESSOR) 25 MG tablet Take 1 tablet by mouth 2 times daily Yes OLVIN Carlisle CNP   potassium chloride (KLOR-CON M) 20 MEQ extended release tablet Take 1 tablet by mouth daily Yes OLVIN Carlisle CNP   furosemide (LASIX) 40 MG tablet Take 1 tablet by mouth daily Yes OLVIN Carlisle CNP   Biotin 5000 MCG TABS Take 1 tablet by mouth daily Yes Historical Provider, MD   Multiple Vitamins-Minerals (ICAPS PO) Take 2 capsules by mouth 2 times daily Yes Historical Provider, MD     Family History  History reviewed. No pertinent family history. · ROS:   · Cardiovascular: Reviewed in HPI  · Allergic/Immunologic: No nasal congestion or hives. · All other ROS are reviewed and are unremarkable. PHYSICAL EXAM:      Wt Readings from Last 3 Encounters:   07/30/18 138 lb 9.6 oz (62.9 kg)   06/25/18 134 lb (60.8 kg)   04/19/18 132 lb (59.9 kg)       BP Readings from Last 3 Encounters:   07/30/18 110/80   06/25/18 124/84   04/19/18 132/84       Pulse Readings from Last 3 Encounters:   07/30/18 97   06/25/18 60   04/19/18 52       Exam   ENT: neg   NEUROLOGIC:  Neg   PSYCH: neg  SKIN: Warm and dry. LUNGS:  No increased work of breathing and clear to auscultation, no crackles or wheezing  CARDIOVASCULAR: RRR  ABDOMEN: soft   EXT:minimal      Assessment:    Assessment:     afib    TSH wnl 11/2016   CHADs2 VASc2   Hgb 10.4 / creatinine <0.5 on xarelto 15 mg daily    The left atrium is moderately dilated. The right atrium is moderately  dilated. On xarelto 20mg  Hgb 10.4 / she is 89yo and 130lbs with multiple bruising  / lower dose to 15mg   Stop asa and fish oil       HFpEF  On low dose lasix /  appears euvolmic today  Normal cors per [facility] 2014     AS / MR   TAVR 9/12/17   Echo 6/2018   There is decreased leaflet motion and  \"hockey stick\" appearance of the mitral leaflets c/w moderate mitral   stenosis. The maximum mitral valve pressure gradient is 10 mmHg and the mean   pressure gradient is 7 mmHg. The MVA is 2 cm2. The mitral valve pressure   half-time is calculated at 194msec. Moderate mitral regurgitation is   present. A TAVR appears well seated with a maximum gradient of 11 mmHg and a   mean gradient of 8 mmHg. The TAVR has mild amisha-valvular leak.  Moderate   tricuspid regurgitation.      HLD     HDL 68 in 2015   Not on statin      recently has been dizzy / no presyncope / syncope / consider to ENT to assess any ear problems or cerumen      Plan:   Patient education on touch screen in room   Discussed heart heathy lifestyle including nutrition, exercise & importance of nonsmoking,       c/o \"going blind\" for seconds x one  event  / she went to her eye dr. Landis Gave he sent her to cardiology      ? amaurosis fugax us carotid : 6/2018   Duplex sonography with color flow enhancement was performed bilaterally on    the cervical carotid system. No evidence of hemodynamically significant     stenosis in the bilateral carotid and vertebral arteries.       Echo bubble bubble study was performed and fails to show any evidence of right to left   shunting.       Plan:   Patient education on touch screen in room   Discussed heart heathy lifestyle including nutrition, exercise & importance of nonsmoking    c/o muscle cramping in all large muscles  Will do lab work & take statin holiday / fu in 3 months       [id number]

## 2018-08-06 DIAGNOSIS — R06.02 SOB (SHORTNESS OF BREATH): ICD-10-CM

## 2018-08-06 DIAGNOSIS — I48.20 CHRONIC ATRIAL FIBRILLATION (HCC): ICD-10-CM

## 2018-08-06 DIAGNOSIS — I35.0 NONRHEUMATIC AORTIC VALVE STENOSIS: ICD-10-CM

## 2018-08-06 LAB
A/G RATIO: 1.3 (ref 1.1–2.2)
ALBUMIN SERPL-MCNC: 3.9 G/DL (ref 3.4–5)
ALP BLD-CCNC: 71 U/L (ref 40–129)
ALT SERPL-CCNC: 22 U/L (ref 10–40)
ANION GAP SERPL CALCULATED.3IONS-SCNC: 14 MMOL/L (ref 3–16)
AST SERPL-CCNC: 17 U/L (ref 15–37)
BILIRUB SERPL-MCNC: 0.9 MG/DL (ref 0–1)
BILIRUBIN DIRECT: <0.2 MG/DL (ref 0–0.3)
BILIRUBIN, INDIRECT: NORMAL MG/DL (ref 0–1)
BUN BLDV-MCNC: 16 MG/DL (ref 7–20)
CALCIUM SERPL-MCNC: 9.5 MG/DL (ref 8.3–10.6)
CHLORIDE BLD-SCNC: 100 MMOL/L (ref 99–110)
CHOLESTEROL, TOTAL: 195 MG/DL (ref 0–199)
CO2: 28 MMOL/L (ref 21–32)
CREAT SERPL-MCNC: 0.8 MG/DL (ref 0.6–1.2)
GFR AFRICAN AMERICAN: >60
GFR NON-AFRICAN AMERICAN: >60
GLOBULIN: 3 G/DL
GLUCOSE BLD-MCNC: 88 MG/DL (ref 70–99)
HCT VFR BLD CALC: 37.7 % (ref 36–48)
HDLC SERPL-MCNC: 59 MG/DL (ref 40–60)
HEMOGLOBIN: 12.7 G/DL (ref 12–16)
LDL CHOLESTEROL CALCULATED: 116 MG/DL
MAGNESIUM: 1.8 MG/DL (ref 1.8–2.4)
MCH RBC QN AUTO: 34.4 PG (ref 26–34)
MCHC RBC AUTO-ENTMCNC: 33.8 G/DL (ref 31–36)
MCV RBC AUTO: 101.7 FL (ref 80–100)
PDW BLD-RTO: 15.3 % (ref 12.4–15.4)
PLATELET # BLD: 212 K/UL (ref 135–450)
PMV BLD AUTO: 8.6 FL (ref 5–10.5)
POTASSIUM SERPL-SCNC: 4.5 MMOL/L (ref 3.5–5.1)
RBC # BLD: 3.7 M/UL (ref 4–5.2)
SODIUM BLD-SCNC: 142 MMOL/L (ref 136–145)
TOTAL PROTEIN: 6.9 G/DL (ref 6.4–8.2)
TRIGL SERPL-MCNC: 98 MG/DL (ref 0–150)
VLDLC SERPL CALC-MCNC: 20 MG/DL
WBC # BLD: 5.8 K/UL (ref 4–11)

## 2018-08-08 LAB
MISCELLANEOUS LAB TEST ORDER: NORMAL
VITAMIN D 1,25-DIHYDROXY: 17.3 PG/ML (ref 19.9–79.3)

## 2018-09-07 RX ORDER — FUROSEMIDE 40 MG/1
TABLET ORAL
Qty: 180 TABLET | Refills: 0 | Status: SHIPPED | OUTPATIENT
Start: 2018-09-07 | End: 2020-06-03

## 2018-10-16 ENCOUNTER — NURSE ONLY (OUTPATIENT)
Dept: CARDIOLOGY CLINIC | Age: 83
End: 2018-10-16
Payer: MEDICARE

## 2018-10-16 DIAGNOSIS — I44.2 CHB (COMPLETE HEART BLOCK) (HCC): ICD-10-CM

## 2018-10-16 DIAGNOSIS — Z95.0 CARDIAC PACEMAKER IN SITU: ICD-10-CM

## 2018-10-16 DIAGNOSIS — I48.20 CHRONIC ATRIAL FIBRILLATION (HCC): ICD-10-CM

## 2018-10-16 PROCEDURE — 93296 REM INTERROG EVL PM/IDS: CPT | Performed by: INTERNAL MEDICINE

## 2018-10-16 PROCEDURE — 93294 REM INTERROG EVL PM/LDLS PM: CPT | Performed by: INTERNAL MEDICINE

## 2018-10-16 NOTE — LETTER
[address] [facility] [phone] [address] [facility],4[address] Floor [phone]    Pacemaker/Defibrillator Clinic          10/18/18        Mariangel Mcdonough  [address]  [address]        Dear Mariangel Mcdonough    This letter is to inform you that we received the transmission from your monitor at home that checks your pacemaker and/or defibrillator, or implanted heart monitor. Your pacemaker shows normal function. The next date you should transmit will be 1-22-19. Please do not send additional routine transmissions unless specifically requested. Please be aware that the remote device transmission sites are periodically monitored only during regular business hours during which simultaneous in-office device clinics are being run. If your transmission requires attention, we will contact you as soon as possible. Thank you.             Cat [address]

## 2018-10-24 ENCOUNTER — OFFICE VISIT (OUTPATIENT)
Dept: CARDIOLOGY CLINIC | Age: 83
End: 2018-10-24
Payer: MEDICARE

## 2018-10-24 VITALS
WEIGHT: 142.8 LBS | SYSTOLIC BLOOD PRESSURE: 125 MMHG | HEART RATE: 94 BPM | DIASTOLIC BLOOD PRESSURE: 70 MMHG | OXYGEN SATURATION: 95 % | BODY MASS INDEX: 28.84 KG/M2

## 2018-10-24 DIAGNOSIS — Z95.0 CARDIAC PACEMAKER IN SITU: ICD-10-CM

## 2018-10-24 DIAGNOSIS — Z86.73 HISTORY OF CVA (CEREBROVASCULAR ACCIDENT): ICD-10-CM

## 2018-10-24 DIAGNOSIS — I48.0 PAROXYSMAL ATRIAL FIBRILLATION (HCC): ICD-10-CM

## 2018-10-24 DIAGNOSIS — G45.9 TIA (TRANSIENT ISCHEMIC ATTACK): ICD-10-CM

## 2018-10-24 DIAGNOSIS — I35.0 NONRHEUMATIC AORTIC VALVE STENOSIS: ICD-10-CM

## 2018-10-24 DIAGNOSIS — I48.20 CHRONIC ATRIAL FIBRILLATION (HCC): Primary | ICD-10-CM

## 2018-10-24 DIAGNOSIS — I10 ESSENTIAL HYPERTENSION: ICD-10-CM

## 2018-10-24 DIAGNOSIS — I44.2 CHB (COMPLETE HEART BLOCK) (HCC): ICD-10-CM

## 2018-10-24 DIAGNOSIS — I50.33 ACUTE ON CHRONIC DIASTOLIC CHF (CONGESTIVE HEART FAILURE), NYHA CLASS 2 (HCC): ICD-10-CM

## 2018-10-24 PROCEDURE — G8427 DOCREV CUR MEDS BY ELIG CLIN: HCPCS | Performed by: NURSE PRACTITIONER

## 2018-10-24 PROCEDURE — 1123F ACP DISCUSS/DSCN MKR DOCD: CPT | Performed by: NURSE PRACTITIONER

## 2018-10-24 PROCEDURE — 1101F PT FALLS ASSESS-DOCD LE1/YR: CPT | Performed by: NURSE PRACTITIONER

## 2018-10-24 PROCEDURE — 1090F PRES/ABSN URINE INCON ASSESS: CPT | Performed by: NURSE PRACTITIONER

## 2018-10-24 PROCEDURE — G8484 FLU IMMUNIZE NO ADMIN: HCPCS | Performed by: NURSE PRACTITIONER

## 2018-10-24 PROCEDURE — 93000 ELECTROCARDIOGRAM COMPLETE: CPT | Performed by: NURSE PRACTITIONER

## 2018-10-24 PROCEDURE — G8419 CALC BMI OUT NRM PARAM NOF/U: HCPCS | Performed by: NURSE PRACTITIONER

## 2018-10-24 PROCEDURE — 1036F TOBACCO NON-USER: CPT | Performed by: NURSE PRACTITIONER

## 2018-10-24 PROCEDURE — 4040F PNEUMOC VAC/ADMIN/RCVD: CPT | Performed by: NURSE PRACTITIONER

## 2018-10-24 PROCEDURE — 99214 OFFICE O/P EST MOD 30 MIN: CPT | Performed by: NURSE PRACTITIONER

## 2018-10-24 NOTE — PROGRESS NOTES
[facility]   Dr Claudette Kurk. [address], [address]                                                             Outpatient Follow Up Note      CC: Fu with AS/ TAVR aifb HTN HDL  Alert pleasant  / goes to cadiac rehab / here with family  appears near euvolemic/ no c/o cp SOB edema   Not on statin now & myalgias much better     10/24/18  HPI:  Huyen Toro is 80 y.o. female who presents today with  afib AS with TAVR      Pacemaker implant 9/17  /  normal cors per [facility] 4/2014           Past Medical History:   Diagnosis Date    At[address] ([facility]     Cataract, bilateral     Essential hypertension     Hyperlipidemia     Macular degeneration of both eyes     Severe aortic stenosis     TIA (transient ischemic attack) 2014     [address]  Past Surgical History:   Procedure Laterality Date    AORTIC VALVE REPLACEMENT  09/12/2017    Dr. Alka Rangel - transcatheter w/MANJEET 3 valve    APPENDECTOMY  1944    CARDIOVERSION  06/08/2014    Dr. Shane Holland Bilateral 2012    DILATION AND CURETTAGE OF UTERUS      Multiple   [address] Left 09/14/2017    Dr. Cecy Ram. Henthorn - single chamber    TONSILLECTOMY  1937     SH: History   Smoking Status    Never Smoker   Smokeless Tobacco    Never Used     Comment: Not needed     Current Medications:  Prior to Visit Medications    Medication Sig Taking?  Authorizing Provider   furosemide (LASIX) 40 MG tablet TAKE ONE TABLET BY MOUTH TWICE A DAY Yes OLVIN Keenan CNP   rivaroxaban (XARELTO) 15 MG TABS tablet Take 1 tablet by mouth daily (with breakfast) Yes OLVIN Keenan CNP   metoprolol tartrate (LOPRESSOR) 25 MG tablet Take 1 tablet by mouth 2 times daily Yes OLVIN Keenan CNP   potassium chloride (KLOR-CON M) 20 MEQ extended release tablet Take 1 tablet by mouth daily Yes OLVIN Keenan CNP   Biotin 5000 MCG TABS Take 1

## 2018-11-16 RX ORDER — POTASSIUM CHLORIDE 20MEQ/15ML
LIQUID (ML) ORAL
Refills: 1 | OUTPATIENT
Start: 2018-11-16

## 2018-12-14 ENCOUNTER — TELEPHONE (OUTPATIENT)
Dept: CARDIOLOGY CLINIC | Age: 83
End: 2018-12-14

## 2019-01-02 RX ORDER — POTASSIUM CHLORIDE 20MEQ/15ML
20 LIQUID (ML) ORAL DAILY
Qty: 450 ML | Refills: 3 | Status: SHIPPED | OUTPATIENT
Start: 2019-01-02 | End: 2019-04-29

## 2019-01-22 ENCOUNTER — NURSE ONLY (OUTPATIENT)
Dept: CARDIOLOGY CLINIC | Age: 84
End: 2019-01-22
Payer: MEDICARE

## 2019-01-22 DIAGNOSIS — Z95.0 CARDIAC PACEMAKER IN SITU: ICD-10-CM

## 2019-01-22 DIAGNOSIS — I44.2 CHB (COMPLETE HEART BLOCK) (HCC): ICD-10-CM

## 2019-01-22 DIAGNOSIS — I48.20 CHRONIC ATRIAL FIBRILLATION (HCC): ICD-10-CM

## 2019-01-22 PROCEDURE — 93296 REM INTERROG EVL PM/IDS: CPT | Performed by: INTERNAL MEDICINE

## 2019-01-22 PROCEDURE — 93294 REM INTERROG EVL PM/LDLS PM: CPT | Performed by: INTERNAL MEDICINE

## 2019-04-29 ENCOUNTER — OFFICE VISIT (OUTPATIENT)
Dept: CARDIOLOGY CLINIC | Age: 84
End: 2019-04-29
Payer: MEDICARE

## 2019-04-29 ENCOUNTER — PROCEDURE VISIT (OUTPATIENT)
Dept: CARDIOLOGY CLINIC | Age: 84
End: 2019-04-29

## 2019-04-29 VITALS
SYSTOLIC BLOOD PRESSURE: 98 MMHG | DIASTOLIC BLOOD PRESSURE: 72 MMHG | HEART RATE: 60 BPM | WEIGHT: 136 LBS | BODY MASS INDEX: 27.47 KG/M2

## 2019-04-29 DIAGNOSIS — I44.2 CHB (COMPLETE HEART BLOCK) (HCC): ICD-10-CM

## 2019-04-29 DIAGNOSIS — I48.20 CHRONIC ATRIAL FIBRILLATION (HCC): ICD-10-CM

## 2019-04-29 DIAGNOSIS — I10 ESSENTIAL HYPERTENSION: Primary | ICD-10-CM

## 2019-04-29 DIAGNOSIS — I10 ESSENTIAL HYPERTENSION: ICD-10-CM

## 2019-04-29 DIAGNOSIS — Z95.0 CARDIAC PACEMAKER IN SITU: Primary | ICD-10-CM

## 2019-04-29 DIAGNOSIS — R06.02 SOB (SHORTNESS OF BREATH): ICD-10-CM

## 2019-04-29 LAB
ALBUMIN SERPL-MCNC: 4.3 G/DL (ref 3.4–5)
ANION GAP SERPL CALCULATED.3IONS-SCNC: 11 MMOL/L (ref 3–16)
BUN BLDV-MCNC: 33 MG/DL (ref 7–20)
CALCIUM SERPL-MCNC: 9.8 MG/DL (ref 8.3–10.6)
CHLORIDE BLD-SCNC: 104 MMOL/L (ref 99–110)
CO2: 29 MMOL/L (ref 21–32)
CREAT SERPL-MCNC: 1.2 MG/DL (ref 0.6–1.2)
GFR AFRICAN AMERICAN: 51
GFR NON-AFRICAN AMERICAN: 42
GLUCOSE BLD-MCNC: 96 MG/DL (ref 70–99)
PHOSPHORUS: 4.1 MG/DL (ref 2.5–4.9)
POTASSIUM SERPL-SCNC: 4.7 MMOL/L (ref 3.5–5.1)
SODIUM BLD-SCNC: 144 MMOL/L (ref 136–145)

## 2019-04-29 PROCEDURE — G8427 DOCREV CUR MEDS BY ELIG CLIN: HCPCS | Performed by: INTERNAL MEDICINE

## 2019-04-29 PROCEDURE — 1036F TOBACCO NON-USER: CPT | Performed by: INTERNAL MEDICINE

## 2019-04-29 PROCEDURE — 1123F ACP DISCUSS/DSCN MKR DOCD: CPT | Performed by: INTERNAL MEDICINE

## 2019-04-29 PROCEDURE — 4040F PNEUMOC VAC/ADMIN/RCVD: CPT | Performed by: INTERNAL MEDICINE

## 2019-04-29 PROCEDURE — 1090F PRES/ABSN URINE INCON ASSESS: CPT | Performed by: INTERNAL MEDICINE

## 2019-04-29 PROCEDURE — 93289 INTERROG DEVICE EVAL HEART: CPT | Performed by: INTERNAL MEDICINE

## 2019-04-29 PROCEDURE — 99214 OFFICE O/P EST MOD 30 MIN: CPT | Performed by: INTERNAL MEDICINE

## 2019-04-29 PROCEDURE — G8419 CALC BMI OUT NRM PARAM NOF/U: HCPCS | Performed by: INTERNAL MEDICINE

## 2019-04-29 RX ORDER — TIZANIDINE 2 MG/1
2 TABLET ORAL EVERY EVENING
COMMUNITY
End: 2021-02-12

## 2019-04-29 RX ORDER — MULTIVIT-MIN/IRON/FOLIC ACID/K 18-600-40
CAPSULE ORAL
COMMUNITY
End: 2021-01-22

## 2019-04-29 RX ORDER — LISINOPRIL AND HYDROCHLOROTHIAZIDE 12.5; 1 MG/1; MG/1
1 TABLET ORAL DAILY
COMMUNITY
End: 2021-01-22

## 2019-04-29 RX ORDER — CELECOXIB 100 MG/1
100 CAPSULE ORAL DAILY
COMMUNITY

## 2019-04-29 NOTE — PROGRESS NOTES
[facility]   Dr Catherine Mina. Kaley Mcdonough MD, [address] [facility]                                                                 Outpatient Follow Up Note         04/29/19  HPI:  Ambreen Floyd is 80 y.o. female who presents today with  afib AS with TAVR area and she is here today for follow-up and is doing well. We did interrogate her device and seems to be functioning very well. No major changes need to be done at this time.      Pacemaker implant 9/17  /  normal cors per [facility] 4/2014         Mitral valve stenosis  Aortic valve stenosis and status post TAVR  Atrial fibrillation chronic  Pacemaker implant at 9/17  Hypertension  Hyperlipidemia    Past Medical History:   Diagnosis Date    Atrial fibrillation ([address]     Cataract, bilateral     Essential hypertension     Hyperlipidemia     Macular degeneration of both eyes     Severe aortic stenosis     TIA (transient ischemic attack) 2014     [address] [place]  Past Surgical History:   Procedure Laterality Date    AORTIC VALVE REPLACEMENT  09/12/2017    Dr. Carrillo Erp - transcatheter w/MANJEET 3 valve    APPENDECTOMY  1944    CARDIOVERSION  06/08/2014    Dr. Garcia Captain Bilateral 2012    DILATION AND CURETTAGE OF UTERUS      Multiple    HYSTERECTOMY  1970    PACEMAKER PLACEMENT Left 09/14/2017    Dr. Rolly Edward. [place] - single chamber    TONSILLECTOMY  1937     SH:       Social History     Tobacco Use   Smoking Status Never Smoker   Smokeless Tobacco Never Used   Tobacco Comment    Not needed     Current Medications:  Prior to Visit Medications    Medication Sig Taking?  Authorizing Provider   lisinopril-hydrochlorothiazide (ZESTORETIC) 10-12.5 MG per tablet Take 1 tablet by mouth daily Yes Historical Provider, MD   celecoxib (CELEBREX) 100 MG capsule Take 100 mg by mouth daily Yes Historical Provider, MD   tiZANidine (ZANAFLEX) 2 MG tablet Take 2 mg by mouth every evening Yes Historical Provider, MD   Cholecalciferol (VITAMIN D) 2000 units CAPS capsule Take by mouth Yes Historical Provider, MD   metoprolol tartrate (LOPRESSOR) 25 MG tablet Take 1 tablet by mouth 2 times daily  Patient taking differently: Take 50 mg by mouth 2 times daily  Yes OLVIN Montanez CNP   furosemide (LASIX) 40 MG tablet TAKE ONE TABLET BY MOUTH TWICE A DAY Yes OLVIN Montanez CNP   rivaroxaban (XARELTO) 15 MG TABS tablet Take 1 tablet by mouth daily (with breakfast) Yes OLVIN Montanez CNP   potassium chloride (KLOR-CON M) 20 MEQ extended release tablet Take 1 tablet by mouth daily Yes OLVIN Montanez CNP   Biotin 5000 MCG TABS Take 1 tablet by mouth daily Yes Historical Provider, MD   Multiple Vitamins-Minerals (ICAPS PO) Take 2 capsules by mouth 2 times daily Yes Historical Provider, MD     Family History  No family history on file. · ROS:   · Cardiovascular: Reviewed in HPI  · Allergic/Immunologic: No nasal congestion or hives. · All other ROS are reviewed and are unremarkable. PHYSICAL EXAM:      Wt Readings from Last 3 Encounters:   04/29/19 136 lb (61.7 kg)   10/24/18 142 lb 12.8 oz (64.8 kg)   07/30/18 138 lb 9.6 oz (62.9 kg)       BP Readings from Last 3 Encounters:   04/29/19 98/72   10/24/18 125/70   07/30/18 110/80       Pulse Readings from Last 3 Encounters:   04/29/19 60   10/24/18 94   07/30/18 97       Exam   ENT: neg   NEUROLOGIC:  Neg   PSYCH: neg  SKIN: Warm and dry. LUNGS:  No increased work of breathing and clear to auscultation, no crackles or wheezing  CARDIOVASCULAR: irregular   ABDOMEN: soft   EXT: <1+ edema      Assessment:     afib  / CHB /  she is controlled afib   MDT PPM    TSH wnl 11/2016   CHADs2 VASc2   The left atrium is moderately dilated. The right atrium is moderately  dilated.   Was on xarelto 20mg  changed: to 15mg   Hgb 12.7 / she is 89yo & 142llbs with multiple bruising improved   / lowered dose to 15mg   Stop asa and fish oil       HFpEF  On low dose lasix /  appears [facility] today  Normal cors per [facility] 2014    Hx c/o \"going blind\" for seconds x one  event  / resolved    she went to her eye drRita & he sent her to cardiology   Echo bubble bubble study was performed and fails to show any evidence of right to left   shunting.     ? amaurosis fugax us carotid : 6/2018   Duplex sonography with color flow enhancement was performed bilaterally on    the cervical carotid system. No evidence of hemodynamically significant      stenosis in the bilateral carotid and vertebral arteries.        AS / MR   TAVR 9/12/17   Echo 6/2018   There is decreased leaflet motion and  \"hockey stick\" appearance of the mitral leaflets c/w moderate mitral   stenosis. The maximum mitral valve pressure gradient is 10 mmHg and the mean   pressure gradient is 7 mmHg. The MVA is 2 cm2. The mitral valve pressure   half-time is calculated at 194msec. Moderate mitral regurgitation is   present. A TAVR appears well seated with a maximum gradient of 11 mmHg and a   mean gradient of 8 mmHg. The TAVR has mild amisha-valvular leak. Moderate   tricuspid regurgitation.      HLD     HDL 68 in 2015   Not on statin      recently has been dizzy / no presyncope / syncope / consider to ENT to assess any ear problems or cerumen       Plan:   Patient education on touch screen in room   Discussed heart heathy lifestyle including nutrition, exercise & importance of nonsmoking,    EKG   10/24/18 rate 78 afib   Blood work fu in 6 months with MDT interrogation   Vitals are generally good today. We'll continue her current therapy. She does have mitral valve stenosis by echocardiogram.  The aortic valve seems to be well seated and functioning well on the last echo June 2018. I think she is doing exceptionally well. He did continue current therapy return to see me in 4 months. Lang Gonzalez M.D.  [facility]

## 2019-05-01 ENCOUNTER — TELEPHONE (OUTPATIENT)
Dept: CARDIOLOGY CLINIC | Age: 84
End: 2019-05-01

## 2019-06-03 ENCOUNTER — TELEPHONE (OUTPATIENT)
Dept: CARDIOLOGY CLINIC | Age: 84
End: 2019-06-03

## 2019-06-03 NOTE — TELEPHONE ENCOUNTER
[address] [facility] called for a refill of     potassium chloride 10% solution     The patient has trouble swallowing the tablet. Please send to   [address] [facility], [address] [facility], [address] [facility] [address] [phone]    Last visit: 4-29-19  Next Visit: 9-5-19

## 2019-07-29 ENCOUNTER — TELEPHONE (OUTPATIENT)
Dept: CARDIOLOGY CLINIC | Age: 84
End: 2019-07-29

## 2019-09-05 ENCOUNTER — OFFICE VISIT (OUTPATIENT)
Dept: CARDIOLOGY CLINIC | Age: 84
End: 2019-09-05
Payer: MEDICARE

## 2019-09-05 VITALS
BODY MASS INDEX: 27.87 KG/M2 | WEIGHT: 138 LBS | HEART RATE: 71 BPM | DIASTOLIC BLOOD PRESSURE: 84 MMHG | SYSTOLIC BLOOD PRESSURE: 120 MMHG

## 2019-09-05 DIAGNOSIS — I35.0 NONRHEUMATIC AORTIC VALVE STENOSIS: ICD-10-CM

## 2019-09-05 DIAGNOSIS — I48.20 CHRONIC ATRIAL FIBRILLATION (HCC): ICD-10-CM

## 2019-09-05 DIAGNOSIS — I44.2 CHB (COMPLETE HEART BLOCK) (HCC): ICD-10-CM

## 2019-09-05 DIAGNOSIS — I50.33 ACUTE ON CHRONIC DIASTOLIC CHF (CONGESTIVE HEART FAILURE), NYHA CLASS 2 (HCC): ICD-10-CM

## 2019-09-05 PROCEDURE — G8419 CALC BMI OUT NRM PARAM NOF/U: HCPCS | Performed by: INTERNAL MEDICINE

## 2019-09-05 PROCEDURE — G8427 DOCREV CUR MEDS BY ELIG CLIN: HCPCS | Performed by: INTERNAL MEDICINE

## 2019-09-05 PROCEDURE — 1090F PRES/ABSN URINE INCON ASSESS: CPT | Performed by: INTERNAL MEDICINE

## 2019-09-05 PROCEDURE — 99214 OFFICE O/P EST MOD 30 MIN: CPT | Performed by: INTERNAL MEDICINE

## 2019-09-05 PROCEDURE — 93289 INTERROG DEVICE EVAL HEART: CPT | Performed by: INTERNAL MEDICINE

## 2019-09-05 PROCEDURE — 1123F ACP DISCUSS/DSCN MKR DOCD: CPT | Performed by: INTERNAL MEDICINE

## 2019-09-05 PROCEDURE — 4040F PNEUMOC VAC/ADMIN/RCVD: CPT | Performed by: INTERNAL MEDICINE

## 2019-09-05 PROCEDURE — 1036F TOBACCO NON-USER: CPT | Performed by: INTERNAL MEDICINE

## 2019-09-05 NOTE — PROGRESS NOTES
TONSILLECTOMY  1937     SH:       Social History     Tobacco Use   Smoking Status Never Smoker   Smokeless Tobacco Never Used   Tobacco Comment    Not needed     Current Medications:  Prior to Visit Medications    Medication Sig Taking? Authorizing Provider   metoprolol tartrate (LOPRESSOR) 25 MG tablet Take 1 tablet by mouth 2 times daily Yes OLVIN Jefferson CNP   lisinopril-hydrochlorothiazide (ZESTORETIC) 10-12.5 MG per tablet Take 1 tablet by mouth daily Yes Historical Provider, MD   celecoxib (CELEBREX) 100 MG capsule Take 100 mg by mouth daily Yes Historical Provider, MD   tiZANidine (ZANAFLEX) 2 MG tablet Take 2 mg by mouth every evening Yes Historical Provider, MD   Cholecalciferol (VITAMIN D) 2000 units CAPS capsule Take by mouth Yes Historical Provider, MD   furosemide (LASIX) 40 MG tablet TAKE ONE TABLET BY MOUTH TWICE A DAY Yes OLVIN Jefferson CNP   rivaroxaban (XARELTO) 15 MG TABS tablet Take 1 tablet by mouth daily (with breakfast) Yes OLVIN Jefferson CNP   potassium chloride (KLOR-CON M) 20 MEQ extended release tablet Take 1 tablet by mouth daily Yes OLVIN Jefferson CNP   Biotin 5000 MCG TABS Take 1 tablet by mouth daily Yes Historical Provider, MD   Multiple Vitamins-Minerals (ICAPS PO) Take 2 capsules by mouth 2 times daily Yes Historical Provider, MD     Family History  No family history on file. · ROS:   · Cardiovascular: Reviewed in HPI  · Allergic/Immunologic: No nasal congestion or hives. · All other ROS are reviewed and are unremarkable. PHYSICAL EXAM:      Wt Readings from Last 3 Encounters:   09/05/19 138 lb (62.6 kg)   04/29/19 136 lb (61.7 kg)   10/24/18 142 lb 12.8 oz (64.8 kg)       BP Readings from Last 3 Encounters:   09/05/19 120/84   04/29/19 98/72   10/24/18 125/70       Pulse Readings from Last 3 Encounters:   09/05/19 71   04/29/19 60   10/24/18 94         Constitutional: This patient is oriented to person, place, and time.  Also appears TAVR appears well seated with a maximum gradient of 11 mmHg and a   mean gradient of 8 mmHg. The TAVR has mild amisha-valvular leak. Moderate   tricuspid regurgitation.      HLD     HDL 68 in 2015   Not on statin      recently has been dizzy / no presyncope / syncope / consider to ENT to assess any ear problems or cerumen       Plan:   Patient education on touch screen in room   This time all looks stable from a cardiac perspective. We had a long discussion about possibility of coming off anticoagulation for her chronic atrial fibrillation if a watchman device could be placed. She is considering that. We will make a referral to see Dr. Trevin Ludwig for that consideration. Laura Michael M.D.  [facility] [place]

## 2020-05-04 ENCOUNTER — TELEPHONE (OUTPATIENT)
Dept: CARDIOLOGY CLINIC | Age: 85
End: 2020-05-04

## 2020-05-08 ENCOUNTER — TELEPHONE (OUTPATIENT)
Dept: CARDIOLOGY CLINIC | Age: 85
End: 2020-05-08

## 2020-05-08 DIAGNOSIS — Z95.2 S/P TAVR (TRANSCATHETER AORTIC VALVE REPLACEMENT): Primary | ICD-10-CM

## 2020-05-11 ENCOUNTER — TELEPHONE (OUTPATIENT)
Dept: CARDIOLOGY CLINIC | Age: 85
End: 2020-05-11

## 2020-05-13 ENCOUNTER — OFFICE VISIT (OUTPATIENT)
Dept: CARDIOLOGY CLINIC | Age: 85
End: 2020-05-13
Payer: COMMERCIAL

## 2020-05-13 VITALS
DIASTOLIC BLOOD PRESSURE: 60 MMHG | HEART RATE: 60 BPM | WEIGHT: 137 LBS | BODY MASS INDEX: 27.67 KG/M2 | TEMPERATURE: 96.9 F | SYSTOLIC BLOOD PRESSURE: 98 MMHG

## 2020-05-13 PROCEDURE — 1090F PRES/ABSN URINE INCON ASSESS: CPT | Performed by: INTERNAL MEDICINE

## 2020-05-13 PROCEDURE — 4040F PNEUMOC VAC/ADMIN/RCVD: CPT | Performed by: INTERNAL MEDICINE

## 2020-05-13 PROCEDURE — 99214 OFFICE O/P EST MOD 30 MIN: CPT | Performed by: INTERNAL MEDICINE

## 2020-05-13 PROCEDURE — 93000 ELECTROCARDIOGRAM COMPLETE: CPT | Performed by: INTERNAL MEDICINE

## 2020-05-13 PROCEDURE — 1036F TOBACCO NON-USER: CPT | Performed by: INTERNAL MEDICINE

## 2020-05-13 PROCEDURE — G8417 CALC BMI ABV UP PARAM F/U: HCPCS | Performed by: INTERNAL MEDICINE

## 2020-05-13 PROCEDURE — 1123F ACP DISCUSS/DSCN MKR DOCD: CPT | Performed by: INTERNAL MEDICINE

## 2020-05-13 PROCEDURE — G8427 DOCREV CUR MEDS BY ELIG CLIN: HCPCS | Performed by: INTERNAL MEDICINE

## 2020-05-13 NOTE — PROGRESS NOTES
[facility]   Dr Chet Darnell. Rachana Mcneil MD, [address] [facility]                                                                 Outpatient Follow Up Note         05/13/20  HPI:  Mateusz Reyes is 80 y.o. female who presents today for follow-up of her heart status and her previous TAVR. She is here today for follow-up and is generally doing well. No specific complaints. She now resides at the [address] [place] [address]. We are in the middle of the COVID-19 pandemic. She has no issues and at this time looks stable. She has chronic atrial fibrillation. She had aortic valve stenosis and did have a TAVR procedure. We did have a referred for a watchman consideration but apparently was told by Dr. Grecia Palacio that she would be a poor candidate and she should continue taking the anticoagulation unless she had issues. Examination today is essentially stable. Blood pressure is 100/80 and the lungs are clear.      Pacemaker implant 9/17  /  normal cors per [facility] 4/2014         Mitral valve stenosis  Aortic valve stenosis and status post TAVR  Atrial fibrillation chronic  Pacemaker implant at 9/17  Hypertension  Hyperlipidemia    Past Medical History:   Diagnosis Date    Atrial fibrillation ([address]     Cataract, bilateral     Essential hypertension     Hyperlipidemia     Macular degeneration of both eyes     Severe aortic stenosis     TIA (transient ischemic attack) 2014     [address] [place]  Past Surgical History:   Procedure Laterality Date    AORTIC VALVE REPLACEMENT  09/12/2017    Dr. Latesha Billings - transcatheter w/MANJEET 3 valve    APPENDECTOMY  1944    CARDIOVERSION  06/08/2014    Dr. Shaila Medrano Bilateral 2012    DILATION AND CURETTAGE OF UTERUS      Multiple    HYSTERECTOMY  1970    PACEMAKER PLAC[facility]14[facility]2017    [facility]    TONSILLECTOMY  1937     SH:       Social History     Tobacco Use   Smoking Status Never Smoker Smokeless Tobacco Never Used   Tobacco Comment    Not needed     Current Medications:  Prior to Visit Medications    Medication Sig Taking? Authorizing Provider   rivaroxaban (XARELTO) 15 MG TABS tablet Take 1 tablet by mouth daily (with breakfast) Yes OLVIN Howe CNP   metoprolol tartrate (LOPRESSOR) 25 MG tablet Take 1 tablet by mouth 2 times daily Yes OLVIN Howe CNP   lisinopril-hydrochlorothiazide (ZESTORETIC) 10-12.5 MG per tablet Take 1 tablet by mouth daily Yes Historical Provider, MD   celecoxib (CELEBREX) 100 MG capsule Take 100 mg by mouth daily Yes Historical Provider, MD   tiZANidine (ZANAFLEX) 2 MG tablet Take 2 mg by mouth every evening Yes Historical Provider, MD   Cholecalciferol (VITAMIN D) 2000 units CAPS capsule Take by mouth Yes Historical Provider, MD   furosemide (LASIX) 40 MG tablet TAKE ONE TABLET BY MOUTH TWICE A DAY Yes OLVIN Howe CNP   potassium chloride (KLOR-CON M) 20 MEQ extended release tablet Take 1 tablet by mouth daily Yes OLVIN Howe CNP   Biotin 5000 MCG TABS Take 1 tablet by mouth daily Yes Historical Provider, MD   Multiple Vitamins-Minerals (ICAPS PO) Take 2 capsules by mouth 2 times daily Yes Historical Provider, MD     Family History  No family history on file. · ROS:   · Cardiovascular: Reviewed in HPI  · Allergic/Immunologic: No nasal congestion or hives. · All other ROS are reviewed and are unremarkable. PHYSICAL EXAM:      Wt Readings from Last 3 Encounters:   05/13/20 137 lb (62.1 kg)   09/05/19 138 lb (62.6 kg)   04/29/19 136 lb (61.7 kg)       BP Readings from Last 3 Encounters:   05/13/20 98/60   09/05/19 120/84   04/29/19 98/72       Pulse Readings from Last 3 Encounters:   05/13/20 60   09/05/19 71   04/29/19 60         Constitutional: This patient is oriented to person, place, and time. Also appears well-developed and well-nourished and in no acute distress. HEENT: Normocephalic and atraumatic.   Sclerae anicteric. No xanthelasmas. Conjunctiva white, no subconjunctival hemorrhage   External inspection of ears nose teeth & gums   Eyes:PERRLA EOM's intact. Neck: Neck supple. No JVD present. Carotids without bruits. No mass and no thyromegaly. No lymphadenopathy    Cardiovascular: RRR, normal S1 and S2; no murmur/gallop or rub, PMI nondisplaced  Pulmonary/Chest: Effort normal.  Lungs clear to auscultation. Chest wall nontender  Abdominal: soft, nontender, nondistended. + bowel sounds; no organomegaly or bruits. Aorta normal  Extremities: No edema  Pulses are 2+ radial/carotid/dorsalis pedis bilaterally. Cap refill brisk. Neurological: No cranial nerve deficit. Psychiatric: This patient has a normal mood and affect and the speech is normal as is behavior  EKG on 5/13/2020 atrial fibrillation at 77/min. Nonspecific IVCD with a left bundle branch block pattern. Assessment:     afib  / CHB /  she is controlled afib   MDT PPM    TSH wnl 11/2016   CHADs2 VASc2   The left atrium is moderately dilated. The right atrium is moderately  dilated. Was on xarelto 20mg  changed: to 15mg   Hgb 12.7 / she is 89yo & 142llbs with multiple bruising improved   / lowered dose to 15mg   Stop asa and fish oil       HFpEF  On low dose lasix /  appears euvolmic today  Normal cors per [facility] 2014      ? amaurosis fugax us carotid : 6/2018   Duplex sonography with color flow enhancement was performed bilaterally on    the cervical carotid system. No evidence of hemodynamically significant      stenosis in the bilateral carotid and vertebral arteries.        AS / MR   TAVR 9/12/17   Echo 6/2018   There is decreased leaflet motion and  \"hockey stick\" appearance of the mitral leaflets c/w moderate mitral   stenosis. The maximum mitral valve pressure gradient is 10 mmHg and the mean   pressure gradient is 7 mmHg. The MVA is 2 cm2. The mitral valve pressure   half-time is calculated at 194msec. Moderate mitral regurgitation is   present.  A TAVR

## 2020-05-15 ENCOUNTER — TELEPHONE (OUTPATIENT)
Dept: CARDIOLOGY CLINIC | Age: 85
End: 2020-05-15

## 2020-05-15 NOTE — TELEPHONE ENCOUNTER
Spoke with pt daughter/alternate contact. Left her the information to contact [employer] for assistance in sending transmission. NOT wireless device. [facility] (daughter) states that pt will need assistance from nursing staff so she will forward information to them to help get this all set up and running smoothly. Will reschedule for next week.  -al

## 2020-05-21 ENCOUNTER — NURSE ONLY (OUTPATIENT)
Dept: CARDIOLOGY CLINIC | Age: 85
End: 2020-05-21

## 2020-05-21 PROCEDURE — 93294 REM INTERROG EVL PM/LDLS PM: CPT | Performed by: INTERNAL MEDICINE

## 2020-05-26 ENCOUNTER — NURSE ONLY (OUTPATIENT)
Dept: CARDIOLOGY CLINIC | Age: 85
End: 2020-05-26
Payer: MEDICARE

## 2020-05-26 PROCEDURE — 93296 REM INTERROG EVL PM/IDS: CPT | Performed by: INTERNAL MEDICINE

## 2020-05-26 NOTE — LETTER
[address] [facility] [phone]  [address]  [address] [facility] [address] [phone]    Pacemaker/Defibrillator Clinic          05/22/20        [address] [id number] [facility] [address]        Dear Payal Canela    This letter is to inform you that we received the transmission from your monitor at home that checks your implanted heart device. The next date you should transmit will be 8/25. If your report requires attention, we will notify you. Please be aware that the remote device transmission sites are periodically monitored only during regular business hours during which simultaneous in-office device clinics are being run. If your transmission requires attention, we will contact you as soon as possible. Thank you.             [address]

## 2020-06-03 RX ORDER — FUROSEMIDE 40 MG/1
TABLET ORAL
Qty: 180 TABLET | Refills: 0 | Status: SHIPPED | OUTPATIENT
Start: 2020-06-03 | End: 2021-01-22 | Stop reason: SDUPTHER

## 2020-07-28 RX ORDER — RIVAROXABAN 15 MG/1
TABLET, FILM COATED ORAL
Qty: 90 TABLET | Refills: 2 | Status: SHIPPED | OUTPATIENT
Start: 2020-07-28 | End: 2021-03-29

## 2021-01-15 ENCOUNTER — TELEPHONE (OUTPATIENT)
Dept: CARDIOLOGY CLINIC | Age: 86
End: 2021-01-15

## 2021-01-19 ENCOUNTER — TELEPHONE (OUTPATIENT)
Dept: CARDIOLOGY CLINIC | Age: 86
End: 2021-01-19

## 2021-01-22 ENCOUNTER — VIRTUAL VISIT (OUTPATIENT)
Dept: CARDIOLOGY CLINIC | Age: 86
End: 2021-01-22
Payer: MEDICARE

## 2021-01-22 DIAGNOSIS — I50.9 CONGESTIVE HEART FAILURE, UNSPECIFIED HF CHRONICITY, UNSPECIFIED HEART FAILURE TYPE (HCC): Primary | ICD-10-CM

## 2021-01-22 DIAGNOSIS — I48.20 CHRONIC ATRIAL FIBRILLATION (HCC): ICD-10-CM

## 2021-01-22 DIAGNOSIS — Z95.0 CARDIAC PACEMAKER IN SITU: ICD-10-CM

## 2021-01-22 DIAGNOSIS — I44.2 CHB (COMPLETE HEART BLOCK) (HCC): ICD-10-CM

## 2021-01-22 DIAGNOSIS — I35.0 NONRHEUMATIC AORTIC VALVE STENOSIS: ICD-10-CM

## 2021-01-22 DIAGNOSIS — R06.02 SOB (SHORTNESS OF BREATH): ICD-10-CM

## 2021-01-22 DIAGNOSIS — I50.33 ACUTE ON CHRONIC DIASTOLIC CHF (CONGESTIVE HEART FAILURE), NYHA CLASS 2 (HCC): ICD-10-CM

## 2021-01-22 DIAGNOSIS — I10 ESSENTIAL HYPERTENSION: ICD-10-CM

## 2021-01-22 DIAGNOSIS — Z86.73 HISTORY OF CVA (CEREBROVASCULAR ACCIDENT): ICD-10-CM

## 2021-01-22 PROCEDURE — 99214 OFFICE O/P EST MOD 30 MIN: CPT | Performed by: NURSE PRACTITIONER

## 2021-01-22 RX ORDER — FUROSEMIDE 40 MG/1
20 TABLET ORAL 2 TIMES DAILY
Qty: 180 TABLET | Refills: 0
Start: 2021-01-22

## 2021-01-22 RX ORDER — POTASSIUM CHLORIDE 20 MEQ/1
20 TABLET, EXTENDED RELEASE ORAL DAILY
Qty: 60 TABLET | Refills: 3 | Status: SHIPPED | OUTPATIENT
Start: 2021-01-22

## 2021-01-22 NOTE — PROGRESS NOTES
Consent:  She & health care decision maker is aware that that he may receive a bill for this virual service, depending on his insurance coverage, and has provided verbal consent to proceed: yes   Documentation:  I communicated with the patient and/or health care decision maker about our discussions of care. Details of this discussion including any medical advice provided:    I affirm this is a Patient Initiated Episode with an Established Patient who has not had a related appointment within my department in the past 7 days or scheduled within the next 24 hours. Total Time:>20-25 minutes       The [facility]   [address]   Doxy. me virtual visit  Note    CC: Interval Hx:  Ms Ulises Harding is followed by cardiology for HTN / b/p optimal today   Her meds have been decreased by her PCP for hypotension I changed din epic as it was orderd in [place]   She was on lasix 40mg BID and it decreased to  20mg daily today he wt is up lungs clear upon ausculation ankles 2-3 + edema     Blood work in 3-4 days /TTE to look at TAVR   Fu in 2-3 weeks doxy visit   No c/o SOB/no c/o cp  Increase lasix to 20mg BID  /    Pacemaker implant 9/17  /  normal cors per [address] [facility] 4/2014         Mitral valve stenosis  Aortic valve stenosis and status post TAVR  Atrial fibrillation chronic  Pacemaker implant at 9/17 /[facility]   Hypertension  Hyperlipidemia    MYNOR 6/2018 Left ventricular cavity size is normal. There is mild concentric left   ventricular hypertrophy. Overall left ventricular systolic function appears   normal with an ejection fraction of 55-60%. No regional wall motion   abnormalities are noted. The diastolic dysfunction is indeterminate. There   is mitral annular calcification. There is decreased leaflet motion and   \"hockey stick\" appearance of the mitral leaflets c/w moderate mitral   stenosis.  The maximum mitral valve pressure gradient is 10 mmHg and the mean pressure gradient is 7 mmHg. The MVA is 2 cm2. The mitral valve pressure   half-time is calculated at 194msec. Moderate mitral regurgitation is   present. A TAVR appears well seated with a maximum gradient of 11 mmHg and a   mean gradient of 8 mmHg. The TAVR has mild amisha-valvular leak. Moderate   tricuspid regurgitation. Estimated pulmonary artery systolic pressure is at   47 mmHg assuming a right atrial pressure of 3 mmHg. Mild pulmonic   regurgitation present. The right ventricle is enlarged. Right ventricular   systolic function is reduced . Bi-atrial enlargement. Similar to prior echo   on (10/12/17). I have reviewed notes / any lab work EKGs stress test, angiograms, & images reviewed  I  have spent >20 minutes with pt on phone or on video visit with the patient with more than 50% spent counseling and coordinating care this patient.         Objective:   [facility] 01/01/1970 (Approximate)   No intake or output data in the 24 hours ending 01/22/21 1302  Wt Readings from Last 3 Encounters:   05/13/20 137 lb (62.1 kg)   09/05/19 138 lb (62.6 kg)   04/29/19 136 lb (61.7 kg)       Physical Exam:   [facility] [birth year]/1970 (Approximate)   BP Readings from Last 3 Encounters:   05/13/20 98/60   09/05/19 120/84   04/29/19 98/72     Pulse Readings from Last 3 Encounters:   05/13/20 60   09/05/19 71   04/29/19 60     No intake or output data in the 24 hours ending 01/22/21 1302  Wt Readings from Last 2 Encounters:   05/13/20 137 lb (62.1 kg)   09/05/19 138 lb (62.6 kg)     Constitutional: She is oriented to person, place, and time / in NAD   Vitals /wt per patient at home     Reviewed  available lab work,  EKGs, images, C       Assessment    HTN / b/p optimal today she was hypotensive and PCP changed her meds   Her meds have been decreased by her PCP for hypotension I changed in epic as it was orderd in [place]     HFpEF decompensated she was on lasix 40mg BID and it decreased to  20mg daily today he wt is up lungs clear upon ausculation ankles 2-3 + edema     Blood work in 3-4 days /TTE to look at TAVR   Fu in 2-3 weeks doxy visit   No c/o SOB/no c/o cp  Increase lasix to 20mg BID  from daily     Pacemaker implant 9/17     normal cors per [address] [facility] 4/2014         Atrial fibrillation chronic /on xarelto / no c/o bleeding or falls / in [place]   [id number] Score for Atrial Fibrillation Stroke Risk   Risk   Factors  Component Value   C CHF Yes 1   H HTN Yes 1   A2 Age >= 76 Yes,  (80 y.o.) 2   D DM No 0   S2 Prior Stroke/TIA Yes 2   V Vascular Disease No 0   A Age 74-69 No,  (80 y.o.) 0   Sc Sex female 1    [id number]  Score  7       Aortic valve stenosis and status post TAVR  TAVR 9/12/17   CHB   Pacemaker implant at 9/17    MYNOR 6/2018 Left ventricular cavity size is normal. There is mild concentric left   ventricular hypertrophy. Overall left ventricular systolic function appears   normal with an ejection fraction of 55-60%. No regional wall motion   abnormalities are noted. The diastolic dysfunction is indeterminate. There   is mitral annular calcification. There is decreased leaflet motion and   \"hockey stick\" appearance of the mitral leaflets c/w moderate mitral   stenosis. The maximum mitral valve pressure gradient is 10 mmHg and the mean   pressure gradient is 7 mmHg. The MVA is 2 cm2. The mitral valve pressure   half-time is calculated at 194msec. Moderate mitral regurgitation is   present. A TAVR appears well seated with a maximum gradient of 11 mmHg and a   mean gradient of 8 mmHg. The TAVR has mild amisha-valvular leak. Moderate   tricuspid regurgitation. Estimated pulmonary artery systolic pressure is at   47 mmHg assuming a right atrial pressure of 3 mmHg. Mild pulmonic   regurgitation present. The right ventricle is enlarged. Right ventricular   systolic function is reduced . Bi-atrial enlargement. Similar to prior echo   on (10/12/17). Hypertension  wnl today     HLD      Hx no known CAD  age 80 no changes in meds    Plan:  b/p optimal today   Her meds have been decreased by her PCP for hypotension I changed in epic as it was orderd in [place]   She was on lasix 40mg BID and it decreased to  20mg daily today he wt is up lungs clear upon ausculation ankles 2-3 + edema     Blood work in 3-4 days / TTE to look at TAVR   Fu in 2-3 weeks doxy visit   No c/o SOB/no c/o cp  Increase lasix to 20mg BID    Fu on 2/16 10am doxy visit       Robina BAH, CVNP

## 2021-02-04 ENCOUNTER — PROCEDURE VISIT (OUTPATIENT)
Dept: CARDIOLOGY CLINIC | Age: 86
End: 2021-02-04
Payer: MEDICARE

## 2021-02-04 DIAGNOSIS — Z95.2 S/P TAVR (TRANSCATHETER AORTIC VALVE REPLACEMENT): ICD-10-CM

## 2021-02-04 LAB
LV EF: 38 %
LVEF MODALITY: NORMAL

## 2021-02-04 PROCEDURE — 93306 TTE W/DOPPLER COMPLETE: CPT | Performed by: INTERNAL MEDICINE

## 2021-02-10 ENCOUNTER — TELEPHONE (OUTPATIENT)
Dept: CARDIOLOGY CLINIC | Age: 86
End: 2021-02-10

## 2021-02-10 NOTE — TELEPHONE ENCOUNTER
Called patient's daughter and she will be calling her mother's nursing home to confirm her appointment for February 12,2021.

## 2021-02-10 NOTE — TELEPHONE ENCOUNTER
Pt daughter Called for Emily Munoz Again. Informed her that Emily Munoz was in clinic and will return call when nest available.

## 2021-02-12 ENCOUNTER — VIRTUAL VISIT (OUTPATIENT)
Dept: CARDIOLOGY CLINIC | Age: 86
End: 2021-02-12
Payer: MEDICARE

## 2021-02-12 DIAGNOSIS — I48.91 ATRIAL FIBRILLATION, UNSPECIFIED TYPE (HCC): Primary | ICD-10-CM

## 2021-02-12 DIAGNOSIS — I44.2 CHB (COMPLETE HEART BLOCK) (HCC): ICD-10-CM

## 2021-02-12 DIAGNOSIS — R06.02 SOB (SHORTNESS OF BREATH): ICD-10-CM

## 2021-02-12 DIAGNOSIS — I35.0 NONRHEUMATIC AORTIC VALVE STENOSIS: ICD-10-CM

## 2021-02-12 DIAGNOSIS — I50.33 ACUTE ON CHRONIC DIASTOLIC CHF (CONGESTIVE HEART FAILURE), NYHA CLASS 2 (HCC): ICD-10-CM

## 2021-02-12 DIAGNOSIS — Z86.73 HISTORY OF CVA (CEREBROVASCULAR ACCIDENT): ICD-10-CM

## 2021-02-12 DIAGNOSIS — I48.20 CHRONIC ATRIAL FIBRILLATION (HCC): ICD-10-CM

## 2021-02-12 DIAGNOSIS — I10 ESSENTIAL HYPERTENSION: ICD-10-CM

## 2021-02-12 DIAGNOSIS — Z95.0 CARDIAC PACEMAKER IN SITU: ICD-10-CM

## 2021-02-12 PROCEDURE — 99214 OFFICE O/P EST MOD 30 MIN: CPT | Performed by: NURSE PRACTITIONER

## 2021-02-12 RX ORDER — LEVOTHYROXINE SODIUM 0.03 MG/1
25 TABLET ORAL DAILY
Qty: 90 TABLET | Refills: 1
Start: 2021-02-12

## 2021-02-12 RX ORDER — SPIRONOLACTONE 25 MG/1
25 TABLET ORAL DAILY
Qty: 30 TABLET | Refills: 0
Start: 2021-02-12

## 2021-02-12 NOTE — PATIENT INSTRUCTIONS
Discussed meds her meds / prefers he only take Celebrex as needed or stop taking / recommend tylenol / less NSAIDS  her b/p is soft and stable / on  Lasix 40mg BID / holding her diuretic if her b/p is less than 90 systolic / her metoprolol is 12.5mg BID instead of 25mg BID   HR 80s / VSS wt is stable    Fu in 6-8 weeks doxy visit blood work PTV   March 16th 10 am

## 2021-02-12 NOTE — PROGRESS NOTES
Consent:  She & health care decision maker is aware that that he may receive a bill for this virual service, depending on his insurance coverage, and has provided verbal consent to proceed: yes   Documentation:  I communicated with the patient and/or health care decision maker about our discussions of care. Details of this discussion including any medical advice provided:    I affirm this is a Patient Initiated Episode with an Established Patient who has not had a related appointment within my department in the past 7 days or scheduled within the next 24 hours. Total Time:>20-25 minutes       The [facility]   [address]   Doxy. me virtual visit  Note    CC: Interval Hx:  Ms Sylvia Dougherty is followed by cardiology for HTN / b/p soft stable   Her meds have been decreased by her PCP for hypotension I changed in epic as it was orderd in [place]   HFrEF / her EF has declined from 54 -60% to 35-40% no c/o cp/SOB/edema  sounds near euvolemic   Pacemaker implant 9/17  /  normal cors per [employer] 4/2014         Mitral valve stenosis  Aortic valve stenosis and status post TAVR   Atrial fibrillation chronic on xarelto 15mg daily / noted no bleeding per nurse      Pacemaker implant at 9/17 /[facility]   Hypertension soft    VIt D will check it was low /recommend  supplement    / will recheck she is not on statin / discussed with family and pt and wants to hold off on statin   TIA    ? amaurosis fugax us carotid : 6/2018   Duplex sonography with color flow enhancement was performed bilaterally on    the cervical carotid system. No evidence of hemodynamically significant      stenosis in the bilateral carotid and vertebral arteries.         Today her b/p is soft and stable / on  Lasix 20mg BID / holding her diuretic if her b/p is less than 90 systolic / her metoprolol is 12.5mg BID instead of 25mg BID   HR 80s / VSS wt is stable Discussed meds her meds / prefers he only take Celebrex as needed or stop taking / recommend tylenol / less NSAIDS    complaint with meds    Discussed nutrition / sodium intake / fluid intake   Recommend activity as tolerated     2/4/2021  /  TTE Left ventricular cavity size is normal. There is mildly increased left   ventricular wall thickness. Overall left ventricular systolic function   appears moderately reduced. Ejection fraction is visually estimated to be   35-40%. Grade III diastolic dysfunction with elevated LV filling pressures. Severe biatrial enlargement. A bioprosthetic artificial aortic valve appears well seated with a maximum velocity of 133cm/s and a mean gradient of 4mmHg. No evidence of aortic valve regurgitation or stenosis. There is trivial perivalvular leak. Mild mitral pulmonic regurgitation. Mild tricuspid regurgitation with an RVSP of 31mmHg. MYNOR 6/2018 Left ventricular cavity size is normal. There is mild concentric left   ventricular hypertrophy. Overall left ventricular systolic function appears   normal with an ejection fraction of 55-60%. No regional wall motion   abnormalities are noted. The diastolic dysfunction is indeterminate. There   is mitral annular calcification. There is decreased leaflet motion and   \"hockey stick\" appearance of the mitral leaflets c/w moderate mitral   stenosis. The maximum mitral valve pressure gradient is 10 mmHg and the mean   pressure gradient is 7 mmHg. The MVA is 2 cm2. The mitral valve pressure   half-time is calculated at 194msec. Moderate mitral regurgitation is   present. A TAVR appears well seated with a maximum gradient of 11 mmHg and a   mean gradient of 8 mmHg. The TAVR has mild amisha-valvular leak. Moderate   tricuspid regurgitation. Estimated pulmonary artery systolic pressure is at   23 mmHg assuming a right atrial pressure of 3 mmHg. Mild pulmonic   regurgitation present. The right ventricle is enlarged.  Right ventricular will recheck she is not on statin / discussed with family and pt and wants to hold off on statin   TIA    ? amaurosis fugax us carotid : 6/2018   Duplex sonography with color flow enhancement was performed bilaterally on    the cervical carotid system. No evidence of hemodynamically significant      stenosis in the bilateral carotid and vertebral arteries. HFrEF  Her EF has declined from 55 -60% to 35-40%   No c/o cp/SOB/edema  Sounds near euvolemic     2/4/2021  /  TTE Left ventricular cavity size is normal. There is mildly increased left   ventricular wall thickness. Overall left ventricular systolic function   appears moderately reduced. Ejection fraction is visually estimated to be   35-40%. Grade III diastolic dysfunction with elevated LV filling pressures. Severe biatrial enlargement. A bioprosthetic artificial aortic valve appears well seated with a maximum velocity of 133cm/s and a mean gradient of 4mmHg. No evidence of aortic valve regurgitation or stenosis. There is trivial perivalvular leak. Mild mitral pulmonic regurgitation. Mild tricuspid regurgitation with an RVSP of 31mmHg. MYNOR 6/2018 Left ventricular cavity size is normal. There is mild concentric left   ventricular hypertrophy. Overall left ventricular systolic function appears   normal with an ejection fraction of 55-60%. No regional wall motion   abnormalities are noted. The diastolic dysfunction is indeterminate. There   is mitral annular calcification. There is decreased leaflet motion and   \"hockey stick\" appearance of the mitral leaflets c/w moderate mitral   stenosis. The maximum mitral valve pressure gradient is 10 mmHg and the mean   pressure gradient is 7 mmHg. The MVA is 2 cm2. The mitral valve pressure   half-time is calculated at 194msec. Moderate mitral regurgitation is   present. A TAVR appears well seated with a maximum gradient of 11 mmHg and a   mean gradient of 8 mmHg. The TAVR has mild amisha-valvular leak.  Moderate  tricuspid regurgitation. Estimated pulmonary artery systolic pressure is at   01 mmHg assuming a right atrial pressure of 3 mmHg. Mild pulmonic   regurgitation present. The right ventricle is enlarged. Right ventricular   systolic function is reduced . Bi-atrial enlargement. Similar to prior echo   on (10/12/17).       Plan:  Discussed meds her meds / prefers he only take Celebrex as needed or stop taking / recommend tylenol / less NSAIDS  her b/p is soft and stable / on  Lasix 40mg BID / holding her diuretic if her b/p is less than 90 systolic / her metoprolol is 12.5mg BID instead of 25mg BID   HR 80s / VSS wt is stable    Fu in 6-8 weeks doxy visit blood work PTV   March 16th 10 am     HEBER Cabrera

## 2021-03-29 RX ORDER — RIVAROXABAN 15 MG/1
TABLET, FILM COATED ORAL
Qty: 90 TABLET | Refills: 2 | Status: SHIPPED | OUTPATIENT
Start: 2021-03-29

## 2021-07-02 ENCOUNTER — NURSE ONLY (OUTPATIENT)
Dept: CARDIOLOGY CLINIC | Age: 86
End: 2021-07-02
Payer: MEDICARE

## 2021-07-02 DIAGNOSIS — I44.2 CHB (COMPLETE HEART BLOCK) (HCC): ICD-10-CM

## 2021-07-02 DIAGNOSIS — Z95.0 CARDIAC PACEMAKER IN SITU: ICD-10-CM

## 2021-07-02 DIAGNOSIS — I48.20 CHRONIC ATRIAL FIBRILLATION (HCC): ICD-10-CM

## 2021-07-02 NOTE — PROGRESS NOTES
Location Name  [facility] 2nd Floor ICU 7/2/21. [facility] transmission. Transmission shows normal sensing and pacing function. EP physician will review. See interrogation under the cardiology tab in the [address] [facility] [address] field for more details. Will continue to monitor remotely. Hx pAF (oac, lopressor). Pacing (% of Time Since 31-Oct-2019)  VS 82.1%   17.9%  Night heart rate over 85 bpm for 7 days. EMGs show AF-RVR. Average v rates  bpm and have increased since 3/2021.

## 2021-07-03 PROCEDURE — 93288 INTERROG EVL PM/LDLS PM IP: CPT | Performed by: INTERNAL MEDICINE

## 2021-07-22 ENCOUNTER — TELEPHONE (OUTPATIENT)
Dept: CARDIOLOGY CLINIC | Age: 86
End: 2021-07-22

## 2021-07-22 NOTE — TELEPHONE ENCOUNTER
Per Ana's last note in 2/21, she wanted to see pt in 6-8 weeks. Can we get her back in to see her for TAVR FU?  Shahrzad YOO

## 2021-07-26 ENCOUNTER — TELEPHONE (OUTPATIENT)
Dept: CARDIOLOGY CLINIC | Age: 86
End: 2021-07-26

## 2021-07-29 NOTE — TELEPHONE ENCOUNTER
Antonio Christensen this is a new message
Called and left patient a voice mail that Chucky Dickens would like to see her in the office to discuss her testing results, patient hasn't been seen for awhile.
Erin Elliott please advise.
Hi'  please put in doxy visit for tomorrow morning   Beena Mckinley Pean
Patient is in [facility] took her off of xarelto and the hospital needs to know if she should go back on xarelto 15 mg.or Asprin . Patient had a fall and bruised badly that's why they took her off of xarelto.  Please call daughter Yara Hernandez at [phone]
Patient's daughter, Arina Mayes called for Tracy Alejandra to discuss care. She can be reached at [phone].
Please make a doxy visit
patients son came to office this am to ask questions and met with Dr Christal Patiño    This patient's son Uzma Alonzo showed up in the office today for some answers to questions. This patient is in a nursing home allergy. 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 Is unclear exactly what caused issues to transpire. She is awake and alert but not able to stand or walk. The son is asking the question as to whether she should be on her anticoagulant. No apparent bleeding. Apparently she did have a fall while in her harness and now has bruising around her upper chest cage. Apparently did not fall on the floor or hit objects. At this point no evidence for bleeding. We had a discussion about the watchman device and I offered to be seen for that. Apparently she did have visit with one of our watchman 's about 2 years ago and they all declined at that time. I would like to have her not take anticoagulation however at this point it seems appropriate that she use a low dose at 10 mg. Try to get that arranged through the nursing home and we will try to arrange to see her. It sounds as if this is told he is fading and the family is determining whether she should withdraw care and or into the hospice program.  I have not seen her for several months. We will be happy to see her in the office. I do not think she is of the mind to travel nor the family of her mind to have her travel.   Christal Patiño MD [facility]
negative...